# Patient Record
Sex: FEMALE | Race: OTHER | NOT HISPANIC OR LATINO | URBAN - METROPOLITAN AREA
[De-identification: names, ages, dates, MRNs, and addresses within clinical notes are randomized per-mention and may not be internally consistent; named-entity substitution may affect disease eponyms.]

---

## 2019-05-15 ENCOUNTER — INPATIENT (INPATIENT)
Facility: HOSPITAL | Age: 84
LOS: 14 days | Discharge: NURSING, REHAB OR LONG-TERM CARE | DRG: 603 | End: 2019-05-30
Attending: HOSPITALIST | Admitting: HOSPITALIST
Payer: COMMERCIAL

## 2019-05-15 VITALS
TEMPERATURE: 100 F | WEIGHT: 117.07 LBS | OXYGEN SATURATION: 100 % | HEART RATE: 86 BPM | RESPIRATION RATE: 18 BRPM | HEIGHT: 62 IN | DIASTOLIC BLOOD PRESSURE: 47 MMHG | SYSTOLIC BLOOD PRESSURE: 115 MMHG

## 2019-05-15 LAB
HCT VFR BLD CALC: 30.2 % — LOW (ref 34.5–45)
HGB BLD-MCNC: 9.7 G/DL — LOW (ref 11.5–15.5)
MCHC RBC-ENTMCNC: 32.1 GM/DL — SIGNIFICANT CHANGE UP (ref 32–36)
MCHC RBC-ENTMCNC: 34.3 PG — HIGH (ref 27–34)
MCV RBC AUTO: 106.7 FL — HIGH (ref 80–100)
PLATELET # BLD AUTO: 240 K/UL — SIGNIFICANT CHANGE UP (ref 150–400)
RBC # BLD: 2.83 M/UL — LOW (ref 3.8–5.2)
RBC # FLD: 11.7 % — SIGNIFICANT CHANGE UP (ref 10.3–14.5)
WBC # BLD: 8.41 K/UL — SIGNIFICANT CHANGE UP (ref 3.8–10.5)
WBC # FLD AUTO: 8.41 K/UL — SIGNIFICANT CHANGE UP (ref 3.8–10.5)

## 2019-05-15 NOTE — ED ADULT NURSE NOTE - NSIMPLEMENTINTERV_GEN_ALL_ED
Implemented All Universal Safety Interventions:  Talent to call system. Call bell, personal items and telephone within reach. Instruct patient to call for assistance. Room bathroom lighting operational. Non-slip footwear when patient is off stretcher. Physically safe environment: no spills, clutter or unnecessary equipment. Stretcher in lowest position, wheels locked, appropriate side rails in place.

## 2019-05-15 NOTE — ED ADULT NURSE NOTE - OBJECTIVE STATEMENT
The patient presents with left leg swelling, pain and left heel pressure injury--unstageable 2x2cm dark blister.

## 2019-05-16 DIAGNOSIS — G30.9 ALZHEIMER'S DISEASE, UNSPECIFIED: ICD-10-CM

## 2019-05-16 DIAGNOSIS — I10 ESSENTIAL (PRIMARY) HYPERTENSION: ICD-10-CM

## 2019-05-16 DIAGNOSIS — L03.116 CELLULITIS OF LEFT LOWER LIMB: ICD-10-CM

## 2019-05-16 DIAGNOSIS — Z29.9 ENCOUNTER FOR PROPHYLACTIC MEASURES, UNSPECIFIED: ICD-10-CM

## 2019-05-16 DIAGNOSIS — D64.9 ANEMIA, UNSPECIFIED: ICD-10-CM

## 2019-05-16 LAB
ALBUMIN SERPL ELPH-MCNC: 3.2 G/DL — LOW (ref 3.5–5)
ALP SERPL-CCNC: 43 U/L — SIGNIFICANT CHANGE UP (ref 40–120)
ALT FLD-CCNC: 17 U/L DA — SIGNIFICANT CHANGE UP (ref 10–60)
ANION GAP SERPL CALC-SCNC: 5 MMOL/L — SIGNIFICANT CHANGE UP (ref 5–17)
ANION GAP SERPL CALC-SCNC: 6 MMOL/L — SIGNIFICANT CHANGE UP (ref 5–17)
AST SERPL-CCNC: 12 U/L — SIGNIFICANT CHANGE UP (ref 10–40)
BASOPHILS # BLD AUTO: 0.04 K/UL — SIGNIFICANT CHANGE UP (ref 0–0.2)
BASOPHILS # BLD AUTO: 0.05 K/UL — SIGNIFICANT CHANGE UP (ref 0–0.2)
BASOPHILS NFR BLD AUTO: 0.6 % — SIGNIFICANT CHANGE UP (ref 0–2)
BASOPHILS NFR BLD AUTO: 0.6 % — SIGNIFICANT CHANGE UP (ref 0–2)
BILIRUB SERPL-MCNC: 0.2 MG/DL — SIGNIFICANT CHANGE UP (ref 0.2–1.2)
BUN SERPL-MCNC: 27 MG/DL — HIGH (ref 7–18)
BUN SERPL-MCNC: 31 MG/DL — HIGH (ref 7–18)
CALCIUM SERPL-MCNC: 8.5 MG/DL — SIGNIFICANT CHANGE UP (ref 8.4–10.5)
CALCIUM SERPL-MCNC: 8.8 MG/DL — SIGNIFICANT CHANGE UP (ref 8.4–10.5)
CHLORIDE SERPL-SCNC: 111 MMOL/L — HIGH (ref 96–108)
CHLORIDE SERPL-SCNC: 113 MMOL/L — HIGH (ref 96–108)
CO2 SERPL-SCNC: 23 MMOL/L — SIGNIFICANT CHANGE UP (ref 22–31)
CO2 SERPL-SCNC: 23 MMOL/L — SIGNIFICANT CHANGE UP (ref 22–31)
CREAT SERPL-MCNC: 1.16 MG/DL — SIGNIFICANT CHANGE UP (ref 0.5–1.3)
CREAT SERPL-MCNC: 1.24 MG/DL — SIGNIFICANT CHANGE UP (ref 0.5–1.3)
EOSINOPHIL # BLD AUTO: 0.08 K/UL — SIGNIFICANT CHANGE UP (ref 0–0.5)
EOSINOPHIL # BLD AUTO: 0.09 K/UL — SIGNIFICANT CHANGE UP (ref 0–0.5)
EOSINOPHIL NFR BLD AUTO: 1 % — SIGNIFICANT CHANGE UP (ref 0–6)
EOSINOPHIL NFR BLD AUTO: 1.3 % — SIGNIFICANT CHANGE UP (ref 0–6)
FERRITIN SERPL-MCNC: 103 NG/ML — SIGNIFICANT CHANGE UP (ref 15–150)
FOLATE SERPL-MCNC: 17.7 NG/ML — SIGNIFICANT CHANGE UP
GLUCOSE SERPL-MCNC: 123 MG/DL — HIGH (ref 70–99)
GLUCOSE SERPL-MCNC: 157 MG/DL — HIGH (ref 70–99)
HCT VFR BLD CALC: 28.5 % — LOW (ref 34.5–45)
HGB BLD-MCNC: 8.9 G/DL — LOW (ref 11.5–15.5)
IMM GRANULOCYTES NFR BLD AUTO: 1.4 % — SIGNIFICANT CHANGE UP (ref 0–1.5)
IMM GRANULOCYTES NFR BLD AUTO: 1.5 % — SIGNIFICANT CHANGE UP (ref 0–1.5)
IRON SATN MFR SERPL: 14 % — LOW (ref 15–50)
IRON SATN MFR SERPL: 35 UG/DL — LOW (ref 40–150)
LYMPHOCYTES # BLD AUTO: 1.53 K/UL — SIGNIFICANT CHANGE UP (ref 1–3.3)
LYMPHOCYTES # BLD AUTO: 1.61 K/UL — SIGNIFICANT CHANGE UP (ref 1–3.3)
LYMPHOCYTES # BLD AUTO: 18.2 % — SIGNIFICANT CHANGE UP (ref 13–44)
LYMPHOCYTES # BLD AUTO: 23.6 % — SIGNIFICANT CHANGE UP (ref 13–44)
MAGNESIUM SERPL-MCNC: 2.4 MG/DL — SIGNIFICANT CHANGE UP (ref 1.6–2.6)
MCHC RBC-ENTMCNC: 31.2 GM/DL — LOW (ref 32–36)
MCHC RBC-ENTMCNC: 34.4 PG — HIGH (ref 27–34)
MCV RBC AUTO: 110 FL — HIGH (ref 80–100)
MONOCYTES # BLD AUTO: 0.75 K/UL — SIGNIFICANT CHANGE UP (ref 0–0.9)
MONOCYTES # BLD AUTO: 1.09 K/UL — HIGH (ref 0–0.9)
MONOCYTES NFR BLD AUTO: 11 % — SIGNIFICANT CHANGE UP (ref 2–14)
MONOCYTES NFR BLD AUTO: 13 % — SIGNIFICANT CHANGE UP (ref 2–14)
NEUTROPHILS # BLD AUTO: 4.24 K/UL — SIGNIFICANT CHANGE UP (ref 1.8–7.4)
NEUTROPHILS # BLD AUTO: 5.54 K/UL — SIGNIFICANT CHANGE UP (ref 1.8–7.4)
NEUTROPHILS NFR BLD AUTO: 62 % — SIGNIFICANT CHANGE UP (ref 43–77)
NEUTROPHILS NFR BLD AUTO: 65.8 % — SIGNIFICANT CHANGE UP (ref 43–77)
NRBC # BLD: 0 /100 WBCS — SIGNIFICANT CHANGE UP (ref 0–0)
NRBC # BLD: 0 /100 WBCS — SIGNIFICANT CHANGE UP (ref 0–0)
PHOSPHATE SERPL-MCNC: 3.2 MG/DL — SIGNIFICANT CHANGE UP (ref 2.5–4.5)
PLATELET # BLD AUTO: 247 K/UL — SIGNIFICANT CHANGE UP (ref 150–400)
POTASSIUM SERPL-MCNC: 4.4 MMOL/L — SIGNIFICANT CHANGE UP (ref 3.5–5.3)
POTASSIUM SERPL-MCNC: 4.6 MMOL/L — SIGNIFICANT CHANGE UP (ref 3.5–5.3)
POTASSIUM SERPL-SCNC: 4.4 MMOL/L — SIGNIFICANT CHANGE UP (ref 3.5–5.3)
POTASSIUM SERPL-SCNC: 4.6 MMOL/L — SIGNIFICANT CHANGE UP (ref 3.5–5.3)
PROCALCITONIN SERPL-MCNC: 0.1 NG/ML — SIGNIFICANT CHANGE UP (ref 0.02–0.1)
PROT SERPL-MCNC: 6.7 G/DL — SIGNIFICANT CHANGE UP (ref 6–8.3)
RBC # BLD: 2.59 M/UL — LOW (ref 3.8–5.2)
RBC # FLD: 11.9 % — SIGNIFICANT CHANGE UP (ref 10.3–14.5)
SODIUM SERPL-SCNC: 140 MMOL/L — SIGNIFICANT CHANGE UP (ref 135–145)
SODIUM SERPL-SCNC: 141 MMOL/L — SIGNIFICANT CHANGE UP (ref 135–145)
TIBC SERPL-MCNC: 244 UG/DL — LOW (ref 250–450)
TRANSFERRIN SERPL-MCNC: 198 MG/DL — LOW (ref 200–360)
UIBC SERPL-MCNC: 209 UG/DL — SIGNIFICANT CHANGE UP (ref 110–370)
VIT B12 SERPL-MCNC: <150 PG/ML — LOW (ref 232–1245)
WBC # BLD: 6.83 K/UL — SIGNIFICANT CHANGE UP (ref 3.8–10.5)
WBC # FLD AUTO: 6.83 K/UL — SIGNIFICANT CHANGE UP (ref 3.8–10.5)

## 2019-05-16 PROCEDURE — 73610 X-RAY EXAM OF ANKLE: CPT | Mod: 26,LT

## 2019-05-16 PROCEDURE — 73630 X-RAY EXAM OF FOOT: CPT | Mod: 26,LT

## 2019-05-16 PROCEDURE — 99285 EMERGENCY DEPT VISIT HI MDM: CPT | Mod: 25

## 2019-05-16 PROCEDURE — 99223 1ST HOSP IP/OBS HIGH 75: CPT

## 2019-05-16 RX ORDER — CEFAZOLIN SODIUM 1 G
500 VIAL (EA) INJECTION EVERY 8 HOURS
Refills: 0 | Status: DISCONTINUED | OUTPATIENT
Start: 2019-05-16 | End: 2019-05-18

## 2019-05-16 RX ORDER — LORATADINE 10 MG/1
1 TABLET ORAL
Qty: 0 | Refills: 0 | DISCHARGE

## 2019-05-16 RX ORDER — DONEPEZIL HYDROCHLORIDE 10 MG/1
10 TABLET, FILM COATED ORAL AT BEDTIME
Refills: 0 | Status: DISCONTINUED | OUTPATIENT
Start: 2019-05-16 | End: 2019-05-16

## 2019-05-16 RX ORDER — DONEPEZIL HYDROCHLORIDE 10 MG/1
1 TABLET, FILM COATED ORAL
Qty: 0 | Refills: 0 | DISCHARGE

## 2019-05-16 RX ORDER — GABAPENTIN 400 MG/1
100 CAPSULE ORAL
Refills: 0 | Status: DISCONTINUED | OUTPATIENT
Start: 2019-05-16 | End: 2019-05-30

## 2019-05-16 RX ORDER — DONEPEZIL HYDROCHLORIDE 10 MG/1
10 TABLET, FILM COATED ORAL AT BEDTIME
Refills: 0 | Status: DISCONTINUED | OUTPATIENT
Start: 2019-05-16 | End: 2019-05-30

## 2019-05-16 RX ORDER — ENOXAPARIN SODIUM 100 MG/ML
30 INJECTION SUBCUTANEOUS DAILY
Refills: 0 | Status: DISCONTINUED | OUTPATIENT
Start: 2019-05-16 | End: 2019-05-30

## 2019-05-16 RX ADMIN — DONEPEZIL HYDROCHLORIDE 10 MILLIGRAM(S): 10 TABLET, FILM COATED ORAL at 23:22

## 2019-05-16 RX ADMIN — Medication 100 MILLIGRAM(S): at 15:56

## 2019-05-16 RX ADMIN — Medication 100 MILLIGRAM(S): at 01:00

## 2019-05-16 RX ADMIN — ENOXAPARIN SODIUM 30 MILLIGRAM(S): 100 INJECTION SUBCUTANEOUS at 12:04

## 2019-05-16 RX ADMIN — Medication 100 MILLIGRAM(S): at 16:59

## 2019-05-16 RX ADMIN — Medication 100 MILLIGRAM(S): at 07:43

## 2019-05-16 RX ADMIN — GABAPENTIN 100 MILLIGRAM(S): 400 CAPSULE ORAL at 17:00

## 2019-05-16 NOTE — H&P ADULT - HISTORY OF PRESENT ILLNESS
89 y/o F with PMHx of Alzheimer's disease, HTN, and R breast cancer s/p  mastectomy 25 years ago came in with c/o L leg swelling and pain x 1 month. Patient states that the she had a small wound on her left heel since 1 month which has been worsening. It is associated with L foot erythema, L leg swelling and pain. Denies any traumatic event prior to this episode. Denies fever. She said the wound is black in color and is increasing in size. She went to see her PMD 1 week ago and was prescribed doxycycline without significant improvement. No other complaints at this time.    SH: denies smoking or alcohol use. Lives by herself, has A 24/7. Wheelchair bound at baseline

## 2019-05-16 NOTE — H&P ADULT - PROBLEM SELECTOR PLAN 3
Hb 9.7 with MCV of 107  likely anemia of chronic disease  no hx of melena, hematemesis, or hematochezia  - f/u iron panel, folate, and B12

## 2019-05-16 NOTE — H&P ADULT - NSHPREVIEWOFSYSTEMS_GEN_ALL_CORE
REVIEW OF SYSTEMS:  CONSTITUTIONAL: No fever, weight loss, or fatigue  EYES: No eye pain, visual disturbances, or discharge  ENMT:  No difficulty hearing, tinnitus, vertigo; No sinus or throat pain  NECK: No pain or stiffness  RESPIRATORY: No cough, wheezing, chills or hemoptysis; No shortness of breath  CARDIOVASCULAR: left leg swelling and pain  GASTROINTESTINAL: No abdominal or epigastric pain. No nausea, vomiting, or hematemesis; No diarrhea or constipation. No melena or hematochezia.  GENITOURINARY: No dysuria, frequency, hematuria, or incontinence  NEUROLOGICAL: No headaches, memory loss, loss of strength, numbness, or tremors  SKIN: black fluid filled wound on left heel  ENDOCRINE: No heat or cold intolerance; No hair loss  MUSCULOSKELETAL: No joint pain or swelling; No muscle, back, or extremity pain  HEME/LYMPH: No easy bruising, or bleeding gums  ALLERY AND IMMUNOLOGIC: rash with penicillin

## 2019-05-16 NOTE — H&P ADULT - NSHPLABSRESULTS_GEN_ALL_CORE
9.7    8.41  )-----------( 240      ( 15 May 2019 23:38 )             30.2     05-15    140  |  111<H>  |  31<H>  ----------------------------<  123<H>  4.6   |  23  |  1.24    Ca    8.8      15 May 2019 23:38    TPro  6.7  /  Alb  3.2<L>  /  TBili  0.2  /  DBili  x   /  AST  12  /  ALT  17  /  AlkPhos  43  05-15

## 2019-05-16 NOTE — H&P ADULT - ASSESSMENT
89 y/o F with PMHx of Alzheimer's disease, HTN, and R breast cancer s/p  mastectomy 25 years ago came in with c/o L leg swelling and pain x 1 month. Patient states that the she had a small wound on her left heel since 1 month which has been worsening. It is associated with L foot erythema, L leg swelling and pain. Denies any traumatic event prior to this episode. Denies fever. She said the wound is black in color and is increasing in size. She went to see her PMD 1 week ago and was prescribed doxycycline without significant improvement.

## 2019-05-16 NOTE — ADVANCED PRACTICE NURSE CONSULT - RECOMMEDATIONS
-Clean all affected areas with warm water, mild soap, pat dry, and apply skin prep to the surrounding skin  -Protect the L. Heel wound with an ABD pad Daily PRN  -Elevate/float the patients heels using heel protectors and reposition the patient Q 2hrs using wedges or pillows

## 2019-05-16 NOTE — H&P ADULT - ATTENDING COMMENTS
She is a 89 year old woman with PMH of Alzheimer's disease, HTN, who presents to the ED on account of a left leg swelling and pain x 1 month. She describes a gradually enlarging dark swelling on the left heel.  There was no associated fevers, no chills, no constitutional symptoms. However had a low grade temp in the Ed - 100.1F  He received a week course of doxycycline with no changes.    Vital Signs Last 24 Hrs  T(C): 36.4 (16 May 2019 07:30), Max: 37.8 (15 May 2019 19:43)  T(F): 97.5 (16 May 2019 07:30), Max: 100.1 (15 May 2019 19:43)  HR: 76 (16 May 2019 07:30) (76 - 86)  BP: 113/77 (16 May 2019 07:30) (113/77 - 116/35)  RR: 18 (16 May 2019 07:30) (18 - 18)  SpO2: 98% (16 May 2019 07:30) (98% - 100%)    Elderly woman, NAD, AAO X 3  CTA B/L RRR S1S2  Soft NT ND BS +  pt refusing exam due to extreme pain - clean dressing over left foot and ankle region. Surrounding swelling with mild erythematous changes; no warmth. Exquisitely tender to touch.   Picture of heel noted                          9.7    8.41  )-----------( 240      ( 15 May 2019 23:38 )             30.2     05-15    140  |  111<H>  |  31<H>  ----------------------------<  123<H>  4.6   |  23  |  1.24    Ca    8.8      15 May 2019 23:38    TPro  6.7  /  Alb  3.2<L>  /  TBili  0.2  /  DBili  x   /  AST  12  /  ALT  17  /  AlkPhos  43  05-15    X ray of left foot/ankle noted- no bony or significant soft tissue changes noted.    Impression  89 year old woman with no significant vascular of DM hx presenting with a left heel dark bullae with surrounding soft tissue inflammation s/p "failed" outpatient therapy    A/P  - Left heel/foot /ankle soft tissue inflammation/infection  Admit to medicine  Empiric antibiotics with cefazolin ( low -cross-reactivity with penicillin)  Wound care consult  Pain control  F/up pending labs  If condition worsens on current therapy, would reconsider more specific imaging study, escalate antibiotics and ID consultation.

## 2019-05-16 NOTE — ED PROVIDER NOTE - SKIN COLOR
LE redness and swelling from left ankle up to left mid lower extremity. Area is warm to touch. Heel with tender callus, discolored.

## 2019-05-16 NOTE — H&P ADULT - PROBLEM SELECTOR PLAN 1
Black fluid filled blister of left heel with erythema on left ankle and lower leg  Left leg swelling and b/l LE tenderness  s/p doxycycline outpatient x 7 days  No purulence noted, no leukocytosis, no fever  - f/u b/l LE doppler  - Patient has penicillin allergy (rash)- will start on IV Cefazolin (No risk factors for MRSA)  - wound care consult  - pain control  - f/u blood cx  - f/u procalcitonin

## 2019-05-16 NOTE — H&P ADULT - PROBLEM SELECTOR PLAN 2
on losartan 100mg qday at home   BP on the lower end given her age  - Will hold off to home antihypertensive at this time, restart when appropriate

## 2019-05-16 NOTE — ED PROVIDER NOTE - OBJECTIVE STATEMENT
89 y/o F with past hx of Alzheimer's, HTN, right breast CA s/p mastectomy 25 years ago presents to the ED with family at bedside after pain and swelling to left leg x1 week. As per family, swelling redness, and pain has been worsening. Pt had 7 day tx of doxycycline. She denies fevers, chills, or any other symptoms. 89 y/o F with past hx of Alzheimer's, HTN, right breast CA s/p mastectomy 25 years ago presents to the ED with family at bedside after pain and swelling to left leg x1 week. As per family, swelling redness, and pain has been worsening. Pt had 7 day tx of doxycycline without improvement. She denies fevers, chills, or any other symptoms.

## 2019-05-16 NOTE — ED PROVIDER NOTE - CARDIAC, MLM
Normal rate, regular rhythm.  Heart sounds S1, S2.  No murmurs, rubs or gallops. distal pulses intact

## 2019-05-16 NOTE — H&P ADULT - NSHPPHYSICALEXAM_GEN_ALL_CORE
Vital Signs Last 24 Hrs  T(C): 37.8 (15 May 2019 19:43), Max: 37.8 (15 May 2019 19:43)  T(F): 100.1 (15 May 2019 19:43), Max: 100.1 (15 May 2019 19:43)  HR: 86 (15 May 2019 19:43) (86 - 86)  BP: 115/47 (15 May 2019 19:43) (115/47 - 115/47)  BP(mean): --  RR: 18 (15 May 2019 19:43) (18 - 18)  SpO2: 100% (15 May 2019 19:43) (100% - 100%)    GENERAL: NAD  HEAD:  Atraumatic, Normocephalic  EYES: EOMI, PERRLA, conjunctiva and sclera clear  ENMT: Moist mucous membranes  NECK: Supple  NERVOUS SYSTEM:  Alert & Oriented X 2-3  CHEST/LUNG: Clear to auscultation bilaterally  HEART: Regular rate and rhythm; No murmurs, rubs, or gallops  ABDOMEN: Soft, Nontender, Nondistended; Bowel sounds present  EXTREMITIES:  2+ Peripheral Pulses, Black blister at the heel, erythema from the left foot/ankle to lower leg, tender b/l LE, Calf tenderness +

## 2019-05-16 NOTE — ADVANCED PRACTICE NURSE CONSULT - ASSESSMENT
This is a 89yr old female patient admitted for Cellulitis, presenting with the following:  -There is an intact DTI to the L. Heel (3.5cm x 5cm) without drainage  -There is a Stage 1 Pressure Injury to the Bilateral Gluteus and Sacrum, as evident by non-blanchable erythema

## 2019-05-17 LAB
ALBUMIN SERPL ELPH-MCNC: 2.9 G/DL — LOW (ref 3.5–5)
ALP SERPL-CCNC: 38 U/L — LOW (ref 40–120)
ALT FLD-CCNC: 14 U/L DA — SIGNIFICANT CHANGE UP (ref 10–60)
ANION GAP SERPL CALC-SCNC: 7 MMOL/L — SIGNIFICANT CHANGE UP (ref 5–17)
AST SERPL-CCNC: 10 U/L — SIGNIFICANT CHANGE UP (ref 10–40)
BILIRUB SERPL-MCNC: 0.2 MG/DL — SIGNIFICANT CHANGE UP (ref 0.2–1.2)
BUN SERPL-MCNC: 26 MG/DL — HIGH (ref 7–18)
CALCIUM SERPL-MCNC: 8.7 MG/DL — SIGNIFICANT CHANGE UP (ref 8.4–10.5)
CHLORIDE SERPL-SCNC: 113 MMOL/L — HIGH (ref 96–108)
CO2 SERPL-SCNC: 21 MMOL/L — LOW (ref 22–31)
CREAT SERPL-MCNC: 0.99 MG/DL — SIGNIFICANT CHANGE UP (ref 0.5–1.3)
GLUCOSE BLDC GLUCOMTR-MCNC: 198 MG/DL — HIGH (ref 70–99)
GLUCOSE SERPL-MCNC: 102 MG/DL — HIGH (ref 70–99)
HCT VFR BLD CALC: 28.6 % — LOW (ref 34.5–45)
HGB BLD-MCNC: 9.1 G/DL — LOW (ref 11.5–15.5)
MCHC RBC-ENTMCNC: 31.8 GM/DL — LOW (ref 32–36)
MCHC RBC-ENTMCNC: 34 PG — SIGNIFICANT CHANGE UP (ref 27–34)
MCV RBC AUTO: 106.7 FL — HIGH (ref 80–100)
NRBC # BLD: 0 /100 WBCS — SIGNIFICANT CHANGE UP (ref 0–0)
PLATELET # BLD AUTO: 246 K/UL — SIGNIFICANT CHANGE UP (ref 150–400)
POTASSIUM SERPL-MCNC: 4.4 MMOL/L — SIGNIFICANT CHANGE UP (ref 3.5–5.3)
POTASSIUM SERPL-SCNC: 4.4 MMOL/L — SIGNIFICANT CHANGE UP (ref 3.5–5.3)
PROT SERPL-MCNC: 6 G/DL — SIGNIFICANT CHANGE UP (ref 6–8.3)
RBC # BLD: 2.68 M/UL — LOW (ref 3.8–5.2)
RBC # FLD: 11.7 % — SIGNIFICANT CHANGE UP (ref 10.3–14.5)
SODIUM SERPL-SCNC: 141 MMOL/L — SIGNIFICANT CHANGE UP (ref 135–145)
WBC # BLD: 5.85 K/UL — SIGNIFICANT CHANGE UP (ref 3.8–10.5)
WBC # FLD AUTO: 5.85 K/UL — SIGNIFICANT CHANGE UP (ref 3.8–10.5)

## 2019-05-17 PROCEDURE — 93970 EXTREMITY STUDY: CPT | Mod: 26

## 2019-05-17 PROCEDURE — 99233 SBSQ HOSP IP/OBS HIGH 50: CPT | Mod: GC

## 2019-05-17 RX ORDER — OXYCODONE HYDROCHLORIDE 5 MG/1
5 TABLET ORAL ONCE
Refills: 0 | Status: DISCONTINUED | OUTPATIENT
Start: 2019-05-17 | End: 2019-05-17

## 2019-05-17 RX ORDER — PREGABALIN 225 MG/1
1000 CAPSULE ORAL DAILY
Refills: 0 | Status: COMPLETED | OUTPATIENT
Start: 2019-05-17 | End: 2019-05-24

## 2019-05-17 RX ORDER — ACETAMINOPHEN 500 MG
1000 TABLET ORAL ONCE
Refills: 0 | Status: COMPLETED | OUTPATIENT
Start: 2019-05-17 | End: 2019-05-17

## 2019-05-17 RX ORDER — ACETAMINOPHEN 500 MG
650 TABLET ORAL EVERY 6 HOURS
Refills: 0 | Status: DISCONTINUED | OUTPATIENT
Start: 2019-05-17 | End: 2019-05-30

## 2019-05-17 RX ADMIN — Medication 100 MILLIGRAM(S): at 00:05

## 2019-05-17 RX ADMIN — Medication 1000 MILLIGRAM(S): at 11:30

## 2019-05-17 RX ADMIN — DONEPEZIL HYDROCHLORIDE 10 MILLIGRAM(S): 10 TABLET, FILM COATED ORAL at 22:06

## 2019-05-17 RX ADMIN — GABAPENTIN 100 MILLIGRAM(S): 400 CAPSULE ORAL at 05:54

## 2019-05-17 RX ADMIN — Medication 100 MILLIGRAM(S): at 05:53

## 2019-05-17 RX ADMIN — GABAPENTIN 100 MILLIGRAM(S): 400 CAPSULE ORAL at 17:31

## 2019-05-17 RX ADMIN — OXYCODONE HYDROCHLORIDE 5 MILLIGRAM(S): 5 TABLET ORAL at 18:00

## 2019-05-17 RX ADMIN — Medication 100 MILLIGRAM(S): at 14:02

## 2019-05-17 RX ADMIN — Medication 400 MILLIGRAM(S): at 11:06

## 2019-05-17 RX ADMIN — ENOXAPARIN SODIUM 30 MILLIGRAM(S): 100 INJECTION SUBCUTANEOUS at 14:02

## 2019-05-17 RX ADMIN — OXYCODONE HYDROCHLORIDE 5 MILLIGRAM(S): 5 TABLET ORAL at 17:31

## 2019-05-17 RX ADMIN — Medication 100 MILLIGRAM(S): at 22:06

## 2019-05-17 NOTE — PROGRESS NOTE ADULT - PROBLEM SELECTOR PLAN 1
WBC 5.8  c/w Ancef  wound care consult pending   pain control  f/u blood cx  f/u procalcitonin.  Venous Duplex: lower femoral vein, popliteal vein, and calf veins are not scanned due to patient's request to terminate the examination. otherwise no DVT

## 2019-05-17 NOTE — PROGRESS NOTE ADULT - PROBLEM SELECTOR PLAN 2
On losartan 100mg q day at home   Continue to hold and start when appropriate as BP trending on low end  VS per routine  DASH diet

## 2019-05-17 NOTE — PHYSICAL THERAPY INITIAL EVALUATION ADULT - ADDITIONAL COMMENTS
Patient [place]-speaking. Granddaughter at bedside provided English<>Sudanese translation, with patient's permission using  services (ID# [id number]). Last time patient ambulated was 6 years ago, but she was able to transfer from bed <> wheelchair using RW, with assist, as per patient's daughter. Patient Mauritian-speaking. Granddaughter at bedside provided English<>Pakistani translation, with patient's permission using  services (ID# [id number]). Last time patient ambulated was 6 years ago, but she was able to transfer from bed <> wheelchair using RW, with assist, as per patient's granddaughter.

## 2019-05-17 NOTE — PROGRESS NOTE ADULT - ATTENDING COMMENTS
Patient seen and examined at bedside, feels ok  physical exam:  Vital Signs Last 24 Hrs  T(C): 36.8 (17 May 2019 14:30), Max: 37.4 (16 May 2019 19:24)  T(F): 98.2 (17 May 2019 14:30), Max: 99.4 (16 May 2019 19:24)  HR: 76 (17 May 2019 14:30) (76 - 86)  BP: 101/40 (17 May 2019 14:30) (101/40 - 128/52)  BP(mean): --  RR: 16 (17 May 2019 14:30) (14 - 19)  SpO2: 99% (17 May 2019 14:30) (99% - 100%)  Left LE: redness still noted at the medial aspect of the angle  Black DTI noted on the heel  A/p    1- Left LE cellulitis: no wbc count, no fever  only redness noted  continue Cefzole    2- DTI: wound care nurse input appreciated  Keep heels off loaded from bed.   PT input appreciated

## 2019-05-17 NOTE — PHYSICAL THERAPY INITIAL EVALUATION ADULT - DIAGNOSIS, PT EVAL
Patient presents with slightly impaired balance during sitting, secondary to general weakness in the trunk and BUE 3+/5, RLE 3+/5.

## 2019-05-17 NOTE — PHYSICAL THERAPY INITIAL EVALUATION ADULT - PERTINENT HX OF CURRENT PROBLEM, REHAB EVAL
Patient came to ED from home s/p L swelling and pain x1 month. Patient came to ED from home s/p L LE swelling and pain x1 month.

## 2019-05-17 NOTE — PROGRESS NOTE ADULT - SUBJECTIVE AND OBJECTIVE BOX
This is a 89 y/o F with PMHx sig for Alzheimer's disease, HTN, and R breast cancer s/p  mastectomy 25 years ago came who presented to the ED with c/o L leg swelling and pain x 1 month. Endorses     MEDICATIONS  (STANDING):  ceFAZolin   IVPB 500 milliGRAM(s) IV Intermittent every 8 hours  donepezil 10 milliGRAM(s) Oral at bedtime  enoxaparin Injectable 30 milliGRAM(s) SubCutaneous daily  gabapentin 100 milliGRAM(s) Oral two times a day    MEDICATIONS  (PRN):      __________________________________________________  REVIEW OF SYSTEMS:    CONSTITUTIONAL: No fever,   EYES: no acute visual disturbances  NECK: No pain or stiffness  RESPIRATORY: No cough; No shortness of breath  CARDIOVASCULAR: No chest pain, no palpitations  GASTROINTESTINAL: No pain. No nausea or vomiting; No diarrhea   NEUROLOGICAL: No headache or numbness, no tremors  MUSCULOSKELETAL: No joint pain, no muscle pain  GENITOURINARY: no dysuria, no frequency, no hesitancy  PSYCHIATRY: no depression , no anxiety  ALL OTHER  ROS negative        Vital Signs Last 24 Hrs  T(C): 36.7 (17 May 2019 05:37), Max: 37.4 (16 May 2019 19:24)  T(F): 98 (17 May 2019 05:37), Max: 99.4 (16 May 2019 19:24)  HR: 80 (17 May 2019 05:37) (76 - 92)  BP: 128/52 (17 May 2019 05:37) (109/44 - 128/52)  BP(mean): --  RR: 14 (17 May 2019 05:37) (14 - 20)  SpO2: 100% (17 May 2019 05:37) (100% - 100%)    ________________________________________________  PHYSICAL EXAM:  GENERAL: NAD  HEENT: Normocephalic;  conjunctivae and sclerae clear; moist mucous membranes;   NECK : supple  CHEST/LUNG: Clear to auscultation bilaterally with good air entry   HEART: S1 S2  regular; no murmurs, gallops or rubs  ABDOMEN: Soft, Nontender, Nondistended; Bowel sounds present  EXTREMITIES: no cyanosis; no edema; no calf tenderness  SKIN: warm and dry; no rash  NERVOUS SYSTEM:  Awake and alert; Oriented  to place, person and time ; no new deficits    _________________________________________________  LABS:                        9.1    5.85  )-----------( 246      ( 17 May 2019 06:36 )             28.6     05-17    141  |  113<H>  |  26<H>  ----------------------------<  102<H>  4.4   |  21<L>  |  0.99    Ca    8.7      17 May 2019 06:36  Phos  3.2     05-16  Mg     2.4     05-16    TPro  6.0  /  Alb  2.9<L>  /  TBili  0.2  /  DBili  x   /  AST  10  /  ALT  14  /  AlkPhos  38<L>  05-17        CAPILLARY BLOOD GLUCOSE      POCT Blood Glucose.: 198 mg/dL (17 May 2019 11:51)        RADIOLOGY & ADDITIONAL TESTS:    Imaging  Reviewed:  YES/NO    Consultant(s) Notes Reviewed:   YES/ No      Plan of care was discussed with patient and /or primary care giver; all questions and concerns were addressed This is a 89 y/o F with PMHx sig for Alzheimer's disease, HTN, and R breast cancer s/p  mastectomy 25 years ago came who presented to the ED with c/o L leg swelling and pain x 1 month. Endorses being seen and evaluated by her PCP and given Doxycycline X 7 days without improvement.     MEDICATIONS  (STANDING):  ceFAZolin   IVPB 500 milliGRAM(s) IV Intermittent every 8 hours  donepezil 10 milliGRAM(s) Oral at bedtime  enoxaparin Injectable 30 milliGRAM(s) SubCutaneous daily  gabapentin 100 milliGRAM(s) Oral two times a day    MEDICATIONS  (PRN):      __________________________________________________  REVIEW OF SYSTEMS:  CONSTITUTIONAL: No fever,   EYES: no acute visual disturbances  NECK: No pain or stiffness  RESPIRATORY: No cough; No shortness of breath  CARDIOVASCULAR: No chest pain, no palpitations  GASTROINTESTINAL: No pain. No nausea or vomiting; No diarrhea   NEUROLOGICAL: No headache or numbness, no tremors  MUSCULOSKELETAL: left ankle pain   GENITOURINARY: no dysuria, no frequency, no hesitancy  PSYCHIATRY: no depression , no anxiety  ALL OTHER  ROS negative        Vital Signs Last 24 Hrs  T(C): 36.7 (17 May 2019 05:37), Max: 37.4 (16 May 2019 19:24)  T(F): 98 (17 May 2019 05:37), Max: 99.4 (16 May 2019 19:24)  HR: 80 (17 May 2019 05:37) (76 - 92)  BP: 128/52 (17 May 2019 05:37) (109/44 - 128/52)  BP(mean): --  RR: 14 (17 May 2019 05:37) (14 - 20)  SpO2: 100% (17 May 2019 05:37) (100% - 100%)    ________________________________________________  PHYSICAL EXAM:  GENERAL: NAD  HEENT: Normocephalic;  conjunctivae and sclerae clear; moist mucous membranes;   NECK : supple  CHEST/LUNG: Clear to auscultation bilaterally   HEART: S1 S2  regular; no murmurs, gallops or rubs  ABDOMEN: Soft, Nontender, Nondistended; Bowel sounds present  EXTREMITIES: no cyanosis; no edema; no calf tenderness,, blister at the heel, erythema from the left foot/ankle to lower leg  SKIN: warm and dry; no rash  NERVOUS SYSTEM:  Awake and alert; Oriented  to place, person and time ; no new deficits    _________________________________________________  LABS:                        9.1    5.85  )-----------( 246      ( 17 May 2019 06:36 )             28.6     05-17    141  |  113<H>  |  26<H>  ----------------------------<  102<H>  4.4   |  21<L>  |  0.99    Ca    8.7      17 May 2019 06:36  Phos  3.2     05-16  Mg     2.4     05-16    TPro  6.0  /  Alb  2.9<L>  /  TBili  0.2  /  DBili  x   /  AST  10  /  ALT  14  /  AlkPhos  38<L>  05-17        CAPILLARY BLOOD GLUCOSE      POCT Blood Glucose.: 198 mg/dL (17 May 2019 11:51)        RADIOLOGY & ADDITIONAL TESTS:    Imaging  Reviewed:  YES    Consultant(s) Notes Reviewed:   YES/      Plan of care was discussed with patient and /or primary care giver; all questions and concerns were addressed

## 2019-05-17 NOTE — PHYSICAL THERAPY INITIAL EVALUATION ADULT - ACTIVE RANGE OF MOTION EXAMINATION, REHAB EVAL
deficits as listed below/Right LE Active ROM was WFL (within functional limits)/RLE WFL, LLE no AROM at knee or hip

## 2019-05-17 NOTE — PHYSICAL THERAPY INITIAL EVALUATION ADULT - IMPAIRMENTS CONTRIBUTING IMPAIRED BED MOBILITY, REHAB EVAL
impaired balance/pain/decreased ROM/BUE 3+/5, RLE 3+/5, LLE not assessed due to pain in LLE/decreased strength

## 2019-05-18 DIAGNOSIS — T14.8XXA OTHER INJURY OF UNSPECIFIED BODY REGION, INITIAL ENCOUNTER: ICD-10-CM

## 2019-05-18 PROCEDURE — 99232 SBSQ HOSP IP/OBS MODERATE 35: CPT | Mod: GC

## 2019-05-18 RX ADMIN — Medication 650 MILLIGRAM(S): at 21:57

## 2019-05-18 RX ADMIN — Medication 100 MILLIGRAM(S): at 13:48

## 2019-05-18 RX ADMIN — Medication 100 MILLIGRAM(S): at 05:38

## 2019-05-18 RX ADMIN — Medication 650 MILLIGRAM(S): at 21:12

## 2019-05-18 RX ADMIN — PREGABALIN 1000 MICROGRAM(S): 225 CAPSULE ORAL at 12:29

## 2019-05-18 RX ADMIN — Medication 110 MILLIGRAM(S): at 17:33

## 2019-05-18 RX ADMIN — ENOXAPARIN SODIUM 30 MILLIGRAM(S): 100 INJECTION SUBCUTANEOUS at 12:29

## 2019-05-18 RX ADMIN — Medication 650 MILLIGRAM(S): at 13:00

## 2019-05-18 RX ADMIN — GABAPENTIN 100 MILLIGRAM(S): 400 CAPSULE ORAL at 05:38

## 2019-05-18 RX ADMIN — DONEPEZIL HYDROCHLORIDE 10 MILLIGRAM(S): 10 TABLET, FILM COATED ORAL at 21:12

## 2019-05-18 RX ADMIN — GABAPENTIN 100 MILLIGRAM(S): 400 CAPSULE ORAL at 17:33

## 2019-05-18 RX ADMIN — Medication 650 MILLIGRAM(S): at 12:29

## 2019-05-18 NOTE — PROGRESS NOTE ADULT - SUBJECTIVE AND OBJECTIVE BOX
Patient is a 89y old  Female who presents with a chief complaint of L leg swelling and pain (17 May 2019 14:09)      INTERVAL HPI/OVERNIGHT EVENTS:  Patient seen and examined at bedside, feels ok  Her left leg is more redder then before, PT recommended Home with [facility] PT.     Allergies    penicillin (Rash)    Intolerances        REVIEW OF SYSTEMS:  CONSTITUTIONAL: No fever, weight loss, or fatigue  EYES: No eye pain, visual disturbances, or discharge  RESPIRATORY: No cough, wheezing or shortness of breath  CARDIOVASCULAR: No chest pain, feeling of heart beats  GASTROINTESTINAL: No abdominal or epigastric pain. No nausea, vomiting; No diarrhea or constipation.  GENITOURINARY: No dysuria, frequency, hematuria  NEUROLOGICAL: No headaches  MUSCULOSKELETAL: No joint pain  PSYCHIATRIC: No depression or anxiety  HEME/LYMPH: No easy bruising, or bleeding gums  ALLERGY AND IMMUNOLOGIC: No hives or eczema    Medications:  acetaminophen   Tablet .. 650 milliGRAM(s) Oral every 6 hours PRN Temp greater or equal to 38C (100.4F), Moderate Pain (4 - 6)  ceFAZolin   IVPB 500 milliGRAM(s) IV Intermittent every 8 hours  cyanocobalamin Injectable 1000 MICROGram(s) IntraMuscular daily  donepezil 10 milliGRAM(s) Oral at bedtime  enoxaparin Injectable 30 milliGRAM(s) SubCutaneous daily  gabapentin 100 milliGRAM(s) Oral two times a day      PHYSICAL EXAM:  Vital Signs Last 24 Hrs  T(C): 36.6 (18 May 2019 13:11), Max: 36.6 (18 May 2019 05:36)  T(F): 97.9 (18 May 2019 13:11), Max: 97.9 (18 May 2019 13:11)  HR: 87 (18 May 2019 13:11) (71 - 87)  BP: 110/41 (18 May 2019 13:11) (110/41 - 124/38)  BP(mean): --  RR: 16 (18 May 2019 13:11) (14 - 16)  SpO2: 100% (18 May 2019 13:11) (100% - 100%)    GENERAL: NAD  HEAD:  Atraumatic, Normocephalic  EYES: EOMI, PERRLA, conjunctiva and sclera clear  ENT: moist mucous membranes  NECK: Supple, No JVD, Normal thyroid  NERVOUS SYSTEM:  Alert & Oriented X3, Good concentration; Motor Strength 5/5 B/L upper and lower extremities; DTRs 2+ intact and symmetric  CHEST/LUNG: No rales, rhonchi, wheezing, or rubs  HEART: Regular rate and rhythm; No murmurs, rubs, or gallops  ABDOMEN: Soft, Nontender, Nondistended; Bowel sounds present  EXTREMITIES: left lower extremity at the ankle: more redness and pain compared to before.   SKIN: as above.     LABS:                        9.1    5.85  )-----------( 246      ( 17 May 2019 06:36 )             28.6     05-17    141  |  113<H>  |  26<H>  ----------------------------<  102<H>  4.4   |  21<L>  |  0.99    Ca    8.7      17 May 2019 06:36    TPro  6.0  /  Alb  2.9<L>  /  TBili  0.2  /  DBili  x   /  AST  10  /  ALT  14  /  AlkPhos  38<L>  05-17    LIVER FUNCTIONS - ( 17 May 2019 06:36 )  Alb: 2.9 g/dL / Pro: 6.0 g/dL / ALK PHOS: 38 U/L / ALT: 14 U/L DA / AST: 10 U/L / GGT: x                     Culture & Sensitivity:   CAPILLARY BLOOD GLUCOSE            RADIOLOGY & ADDITIONAL TESTS:  Radiology testing independently reviewed:     Consultant(s) Notes Reviewed:  [ x] YES  [ ] NO    Care Discussed with Consultants/Other Providers [ x] YES  [ ] NO

## 2019-05-19 ENCOUNTER — TRANSCRIPTION ENCOUNTER (OUTPATIENT)
Age: 84
End: 2019-05-19

## 2019-05-19 PROCEDURE — 99232 SBSQ HOSP IP/OBS MODERATE 35: CPT

## 2019-05-19 RX ORDER — PREGABALIN 225 MG/1
1000 CAPSULE ORAL DAILY
Refills: 0 | Status: DISCONTINUED | OUTPATIENT
Start: 2019-05-19 | End: 2019-05-19

## 2019-05-19 RX ADMIN — Medication 110 MILLIGRAM(S): at 17:34

## 2019-05-19 RX ADMIN — DONEPEZIL HYDROCHLORIDE 10 MILLIGRAM(S): 10 TABLET, FILM COATED ORAL at 21:31

## 2019-05-19 RX ADMIN — GABAPENTIN 100 MILLIGRAM(S): 400 CAPSULE ORAL at 17:35

## 2019-05-19 RX ADMIN — ENOXAPARIN SODIUM 30 MILLIGRAM(S): 100 INJECTION SUBCUTANEOUS at 11:25

## 2019-05-19 RX ADMIN — PREGABALIN 1000 MICROGRAM(S): 225 CAPSULE ORAL at 11:25

## 2019-05-19 RX ADMIN — Medication 110 MILLIGRAM(S): at 05:34

## 2019-05-19 RX ADMIN — GABAPENTIN 100 MILLIGRAM(S): 400 CAPSULE ORAL at 05:34

## 2019-05-19 NOTE — DISCHARGE NOTE PROVIDER - NSDCFUADDINST_GEN_ALL_CORE_FT
Wound Care: Blood blister: betadine to left heel, cover with foam dsg and offload b/l heels.  Re evaluate at [facility]

## 2019-05-19 NOTE — PROGRESS NOTE ADULT - PROBLEM SELECTOR PLAN 2
wound care nurse input appreciated  frequent turning in bed Q2 hours  keep heels off loading.    consult for home needs.

## 2019-05-19 NOTE — DISCHARGE NOTE PROVIDER - CARE PROVIDER_API CALL
Adrianna Gooden)  Geriatric Medicine; Internal Medicine  [address] [place] [address]  Phone: [phone]  Fax: [phone]  Follow Up Time: Adrianna Gooden)  Geriatric Medicine; Internal Medicine  [address] [place] [address]  Phone: [phone]  Fax: [phone]  Follow Up Time:     MD at Facility,   Phone: (   )    -  Fax: (   )    -  Follow Up Time:

## 2019-05-19 NOTE — PROGRESS NOTE ADULT - SUBJECTIVE AND OBJECTIVE BOX
Patient is a 89y old  Female who presents with a chief complaint of L leg swelling and pain (19 May 2019 13:12)      INTERVAL HPI/OVERNIGHT EVENTS:  Patient seen and examined at bedside, feels ok   Her left medical aspect of her foot and ankle still is reddish and painful, will give more time for antibiotics to act.   Allergies    penicillin (Rash)    Intolerances        REVIEW OF SYSTEMS:  CONSTITUTIONAL: No fever, weight loss, or fatigue  EYES: No eye pain, visual disturbances, or discharge  RESPIRATORY: No cough, wheezing or shortness of breath  CARDIOVASCULAR: No chest pain, feeling of heart beats  GASTROINTESTINAL: No abdominal or epigastric pain. No nausea, vomiting; No diarrhea or constipation.  GENITOURINARY: No dysuria, frequency, hematuria  NEUROLOGICAL: No headaches  MUSCULOSKELETAL: Pain in the left ankle  PSYCHIATRIC: No depression or anxiety  HEME/LYMPH: No easy bruising, or bleeding gums  ALLERGY AND IMMUNOLOGIC: No hives or eczema    Medications:  acetaminophen   Tablet .. 650 milliGRAM(s) Oral every 6 hours PRN Temp greater or equal to 38C (100.4F), Moderate Pain (4 - 6)  cyanocobalamin Injectable 1000 MICROGram(s) IntraMuscular daily  donepezil 10 milliGRAM(s) Oral at bedtime  doxycycline IVPB 100 milliGRAM(s) IV Intermittent every 12 hours  doxycycline IVPB      enoxaparin Injectable 30 milliGRAM(s) SubCutaneous daily  gabapentin 100 milliGRAM(s) Oral two times a day      PHYSICAL EXAM:  Vital Signs Last 24 Hrs  T(C): 37.6 (19 May 2019 12:59), Max: 37.6 (19 May 2019 12:59)  T(F): 99.7 (19 May 2019 12:59), Max: 99.7 (19 May 2019 12:59)  HR: 88 (19 May 2019 12:59) (86 - 91)  BP: 113/38 (19 May 2019 12:59) (104/39 - 113/38)  BP(mean): --  RR: 16 (19 May 2019 12:59) (16 - 16)  SpO2: 99% (19 May 2019 12:59) (99% - 100%)    GENERAL: NAD  HEAD:  Atraumatic, Normocephalic  EYES: EOMI, PERRLA, conjunctiva and sclera clear  ENT: moist mucous membranes  NECK: Supple, No JVD, Normal thyroid  NERVOUS SYSTEM:  Alert & awake  CHEST/LUNG: No rales, rhonchi, wheezing, or rubs  HEART: Regular rate and rhythm; No murmurs, rubs, or gallops  ABDOMEN: Soft, Nontender, Nondistended; Bowel sounds present  EXTREMITIES: redness over the medial aspect of her ankle and foot.   SKIN: as above    LABS:                      Culture & Sensitivity:   CAPILLARY BLOOD GLUCOSE          05-18 @ 07:01  -  05-19 @ 07:00  --------------------------------------------------------  IN: 150 mL / OUT: 0 mL / NET: 150 mL        RADIOLOGY & ADDITIONAL TESTS:  Radiology testing independently reviewed:     Consultant(s) Notes Reviewed:  [ x] YES  [ ] NO    Care Discussed with Consultants/Other Providers [ x] YES  [ ] NO

## 2019-05-19 NOTE — DISCHARGE NOTE PROVIDER - NSDCCPCAREPLAN_GEN_ALL_CORE_FT
PRINCIPAL DISCHARGE DIAGNOSIS  Diagnosis: Cellulitis of left lower extremity  Assessment and Plan of Treatment: You were admitted with cellulitis of your left leg. You received IV antibiotics in the hospital, please complete your course of oral antibiotics. Please follow up with your primary care doctor in one week. Please seek medical attention if you develop fevers, chills, or your infection worsens. PRINCIPAL DISCHARGE DIAGNOSIS  Diagnosis: Cellulitis of left lower extremity  Assessment and Plan of Treatment: You were admitted with cellulitis of your left leg. You received IV antibiotics in the hospital, please complete your course of oral antibiotics. Please follow up with your primary care doctor in one week. Please seek medical attention if you develop fevers, chills, or your infection worsens.      SECONDARY DISCHARGE DIAGNOSES  Diagnosis: Blister of left heel, initial encounter  Assessment and Plan of Treatment: You had this intact blister prior to admission. It is still intact. It should get betadine to blister and foam protective dsg and offload heels b/l

## 2019-05-19 NOTE — DISCHARGE NOTE PROVIDER - NSDCACTIVITY_GEN_ALL_CORE
No heavy lifting/straining/Do not drive or operate machinery/Walking - Indoors allowed/Do not make important decisions

## 2019-05-19 NOTE — DISCHARGE NOTE PROVIDER - PROVIDER TOKENS
PROVIDER:[TOKEN:[[id number]]] PROVIDER:[TOKEN:[[id number]]],FREE:[LAST:[MD at Facility],PHONE:[(   )    -],FAX:[(   )    -]]

## 2019-05-19 NOTE — DISCHARGE NOTE PROVIDER - HOSPITAL COURSE
89F with PMH of Alzheimer's disease, HTN, who presents to the ED on account of a left leg swelling and pain x 1 month. She describes a gradually enlarging dark swelling on the left heel.  There was no associated fevers, no chills, no constitutional symptoms. However had a low grade temp in the Ed - 100.1 She received a week course of doxycycline with no changes.        X ray of left foot/ankle noted- no bony or significant soft tissue changes noted. Pt admitted for cellulitis and started on IV antibiotics. 89F with PMH of Alzheimer's disease, HTN, who presents to the ED on account of a left leg swelling and pain x 1 month. She describes a gradually enlarging dark swelling on the left heel.  There was no associated fevers, no chills, no constitutional symptoms. However had a low grade temp in the Ed - 100.1 She received a week course of doxycycline with no changes.        X ray of left foot/ankle noted- no bony or significant soft tissue changes noted. Pt admitted for cellulitis and started on IV antibiotics.    Pt was first on Ancef which was changed to Doxycycline. That was also discontinued and Vancomycin was started. We are awaiting authorization since  at this time for [facility] since 5/24 89F with PMH of Alzheimer's disease, HTN, who presents to the ED on account of a left leg swelling and pain x 1 month. She describes a gradually enlarging dark swelling on the left heel.  There was no associated fevers, no chills, no constitutional symptoms. However had a low grade temp in the Ed - 100.1 She received a week course of doxycycline with no changes.        X ray of left foot/ankle noted- no bony or significant soft tissue changes noted. Pt admitted for cellulitis and started on IV antibiotics.    Pt was first on Ancef which was changed to Doxycycline. That was also discontinued and Vancomycin was started. We were awaiting authorization  for [facility] since 5/24. Pt has authorization for T[facility] but family has changed their minds and requesting [facility]. Now need authorization for .

## 2019-05-20 LAB
ANION GAP SERPL CALC-SCNC: 9 MMOL/L — SIGNIFICANT CHANGE UP (ref 5–17)
BUN SERPL-MCNC: 35 MG/DL — HIGH (ref 7–18)
CALCIUM SERPL-MCNC: 8.7 MG/DL — SIGNIFICANT CHANGE UP (ref 8.4–10.5)
CHLORIDE SERPL-SCNC: 111 MMOL/L — HIGH (ref 96–108)
CO2 SERPL-SCNC: 20 MMOL/L — LOW (ref 22–31)
CREAT SERPL-MCNC: 1.1 MG/DL — SIGNIFICANT CHANGE UP (ref 0.5–1.3)
GLUCOSE SERPL-MCNC: 102 MG/DL — HIGH (ref 70–99)
HCT VFR BLD CALC: 29.6 % — LOW (ref 34.5–45)
HGB BLD-MCNC: 9.3 G/DL — LOW (ref 11.5–15.5)
MCHC RBC-ENTMCNC: 31.4 GM/DL — LOW (ref 32–36)
MCHC RBC-ENTMCNC: 33.5 PG — SIGNIFICANT CHANGE UP (ref 27–34)
MCV RBC AUTO: 106.5 FL — HIGH (ref 80–100)
NRBC # BLD: 0 /100 WBCS — SIGNIFICANT CHANGE UP (ref 0–0)
PLATELET # BLD AUTO: 260 K/UL — SIGNIFICANT CHANGE UP (ref 150–400)
POTASSIUM SERPL-MCNC: 4.3 MMOL/L — SIGNIFICANT CHANGE UP (ref 3.5–5.3)
POTASSIUM SERPL-SCNC: 4.3 MMOL/L — SIGNIFICANT CHANGE UP (ref 3.5–5.3)
RBC # BLD: 2.78 M/UL — LOW (ref 3.8–5.2)
RBC # FLD: 11.7 % — SIGNIFICANT CHANGE UP (ref 10.3–14.5)
SODIUM SERPL-SCNC: 140 MMOL/L — SIGNIFICANT CHANGE UP (ref 135–145)
WBC # BLD: 6.47 K/UL — SIGNIFICANT CHANGE UP (ref 3.8–10.5)
WBC # FLD AUTO: 6.47 K/UL — SIGNIFICANT CHANGE UP (ref 3.8–10.5)

## 2019-05-20 PROCEDURE — 99233 SBSQ HOSP IP/OBS HIGH 50: CPT | Mod: GC

## 2019-05-20 RX ADMIN — Medication 110 MILLIGRAM(S): at 05:39

## 2019-05-20 RX ADMIN — DONEPEZIL HYDROCHLORIDE 10 MILLIGRAM(S): 10 TABLET, FILM COATED ORAL at 21:36

## 2019-05-20 RX ADMIN — PREGABALIN 1000 MICROGRAM(S): 225 CAPSULE ORAL at 11:26

## 2019-05-20 RX ADMIN — GABAPENTIN 100 MILLIGRAM(S): 400 CAPSULE ORAL at 05:39

## 2019-05-20 RX ADMIN — ENOXAPARIN SODIUM 30 MILLIGRAM(S): 100 INJECTION SUBCUTANEOUS at 11:26

## 2019-05-20 RX ADMIN — GABAPENTIN 100 MILLIGRAM(S): 400 CAPSULE ORAL at 17:08

## 2019-05-20 RX ADMIN — Medication 110 MILLIGRAM(S): at 17:08

## 2019-05-20 NOTE — PROGRESS NOTE ADULT - PROBLEM SELECTOR PLAN 2
Leg elevation at all times  Home with [facility]  wound care nurse input appreciated

## 2019-05-20 NOTE — PROGRESS NOTE ADULT - SUBJECTIVE AND OBJECTIVE BOX
Patient is a 89y old  Female who presents with a chief complaint of L leg swelling and pain (19 May 2019 15:12)    INTERVAL HPI/OVERNIGHT EVENTS:  Patient seen and examined at bedside, family at her side.   Family changing their mind today and plan for dc to long term placement.   Allergies    penicillin (Rash)    Intolerances        REVIEW OF SYSTEMS:  cannot provide accurate answers for dementia.     Medications:  acetaminophen   Tablet .. 650 milliGRAM(s) Oral every 6 hours PRN Temp greater or equal to 38C (100.4F), Moderate Pain (4 - 6)  cyanocobalamin Injectable 1000 MICROGram(s) IntraMuscular daily  donepezil 10 milliGRAM(s) Oral at bedtime  doxycycline IVPB 100 milliGRAM(s) IV Intermittent every 12 hours  doxycycline IVPB      enoxaparin Injectable 30 milliGRAM(s) SubCutaneous daily  gabapentin 100 milliGRAM(s) Oral two times a day      PHYSICAL EXAM:  Vital Signs Last 24 Hrs  T(C): 36.3 (20 May 2019 11:44), Max: 37.3 (19 May 2019 21:18)  T(F): 97.3 (20 May 2019 11:44), Max: 99.2 (19 May 2019 21:18)  HR: 84 (20 May 2019 11:44) (80 - 84)  BP: 122/39 (20 May 2019 11:44) (114/33 - 129/45)  BP(mean): --  RR: 16 (20 May 2019 11:44) (16 - 16)  SpO2: 99% (20 May 2019 11:44) (99% - 100%)    GENERAL: NAD  HEAD:  Atraumatic, Normocephalic  EYES: EOMI, PERRLA, conjunctiva and sclera clear  ENT: moist mucous membranes  NECK: Supple, No JVD, Normal thyroid  NERVOUS SYSTEM:  Alert & awake  CHEST/LUNG: No rales, rhonchi, wheezing, or rubs  HEART: Regular rate and rhythm; No murmurs, rubs, or gallops  ABDOMEN: Soft, Nontender, Nondistended; Bowel sounds present  EXTREMITIES: left LE redness, heels off pressure.   SKIN: No rashes or lesions    LABS:                        9.3    6.47  )-----------( 260      ( 20 May 2019 07:51 )             29.6     05-20    140  |  111<H>  |  35<H>  ----------------------------<  102<H>  4.3   |  20<L>  |  1.10    Ca    8.7      20 May 2019 07:51                  Culture & Sensitivity:   CAPILLARY BLOOD GLUCOSE          05-19 @ 07:01  -  05-20 @ 07:00  --------------------------------------------------------  IN: 100 mL / OUT: 0 mL / NET: 100 mL        RADIOLOGY & ADDITIONAL TESTS:  Radiology testing independently reviewed:     Consultant(s) Notes Reviewed:  [ x] YES  [ ] NO    Care Discussed with Consultants/Other Providers [ x] YES  [ ] NO

## 2019-05-21 DIAGNOSIS — D51.9 VITAMIN B12 DEFICIENCY ANEMIA, UNSPECIFIED: ICD-10-CM

## 2019-05-21 LAB
CULTURE RESULTS: SIGNIFICANT CHANGE UP
CULTURE RESULTS: SIGNIFICANT CHANGE UP
SPECIMEN SOURCE: SIGNIFICANT CHANGE UP
SPECIMEN SOURCE: SIGNIFICANT CHANGE UP

## 2019-05-21 PROCEDURE — 93971 EXTREMITY STUDY: CPT | Mod: 26,LT

## 2019-05-21 PROCEDURE — 99233 SBSQ HOSP IP/OBS HIGH 50: CPT | Mod: GC

## 2019-05-21 RX ORDER — SODIUM CHLORIDE 9 MG/ML
250 INJECTION INTRAMUSCULAR; INTRAVENOUS; SUBCUTANEOUS ONCE
Refills: 0 | Status: COMPLETED | OUTPATIENT
Start: 2019-05-21 | End: 2019-05-21

## 2019-05-21 RX ADMIN — Medication 650 MILLIGRAM(S): at 15:29

## 2019-05-21 RX ADMIN — DONEPEZIL HYDROCHLORIDE 10 MILLIGRAM(S): 10 TABLET, FILM COATED ORAL at 21:00

## 2019-05-21 RX ADMIN — SODIUM CHLORIDE 250 MILLILITER(S): 9 INJECTION INTRAMUSCULAR; INTRAVENOUS; SUBCUTANEOUS at 18:51

## 2019-05-21 RX ADMIN — GABAPENTIN 100 MILLIGRAM(S): 400 CAPSULE ORAL at 18:50

## 2019-05-21 RX ADMIN — GABAPENTIN 100 MILLIGRAM(S): 400 CAPSULE ORAL at 06:10

## 2019-05-21 RX ADMIN — ENOXAPARIN SODIUM 30 MILLIGRAM(S): 100 INJECTION SUBCUTANEOUS at 12:07

## 2019-05-21 RX ADMIN — Medication 650 MILLIGRAM(S): at 16:36

## 2019-05-21 RX ADMIN — PREGABALIN 1000 MICROGRAM(S): 225 CAPSULE ORAL at 12:07

## 2019-05-21 RX ADMIN — Medication 110 MILLIGRAM(S): at 06:10

## 2019-05-21 NOTE — PROGRESS NOTE ADULT - ATTENDING COMMENTS
Patient seen and examined at bedside, Feels ok, sleepy.  Pending Long term placement as per family request.  PHysical exam;  Vital Signs Last 24 Hrs  T(C): 36.4 (21 May 2019 06:05), Max: 36.4 (21 May 2019 06:05)  T(F): 97.6 (21 May 2019 06:05), Max: 97.6 (21 May 2019 06:05)  HR: 76 (21 May 2019 06:05) (76 - 91)  BP: 103/39 (21 May 2019 06:05) (103/35 - 103/39)  BP(mean): --  RR: 16 (21 May 2019 06:05) (16 - 17)  SpO2: 99% (21 May 2019 06:05) (99% - 100%)  A/p  1- Left foot pain and cellulitis: switch to unasyn  dc doxy  Pending long term placement. Patient seen and examined at bedside, Feels ok, sleepy.  Pending Long term placement as per family request.  PHysical exam;  Vital Signs Last 24 Hrs  T(C): 36.4 (21 May 2019 06:05), Max: 36.4 (21 May 2019 06:05)  T(F): 97.6 (21 May 2019 06:05), Max: 97.6 (21 May 2019 06:05)  HR: 76 (21 May 2019 06:05) (76 - 91)  BP: 103/39 (21 May 2019 06:05) (103/35 - 103/39)  BP(mean): --  RR: 16 (21 May 2019 06:05) (16 - 17)  SpO2: 99% (21 May 2019 06:05) (99% - 100%)  A/p  1- Left foot pain and cellulitis  dc doxy  Pending long term placement.

## 2019-05-21 NOTE — PROGRESS NOTE ADULT - PROBLEM SELECTOR PLAN 2
On losartan 100mg q day at home   Continue to hold and start when appropriate as  this AM  VS per routine  DASH diet

## 2019-05-21 NOTE — PROGRESS NOTE ADULT - SUBJECTIVE AND OBJECTIVE BOX
89 y/o F with PMH of Alzheimer's disease, HTN, and R breast cancer s/p  mastectomy 25 years ago came in with c/o L leg swelling and pain x 1 month. Patient states that the she had a small wound on her left heel since 1 month which has been worsening. It is associated with L foot erythema, L leg swelling and pain. Denies any traumatic event prior to this episode. Denies fever. She said the wound is black in color and is increasing in size. She went to see her PMD 1 week ago and was prescribed doxycycline without significant improvement. No other complaints at this time.  Pt had attempted LLE doppler but was unable to be completed. Reordered for today  NP Note discussed with  Primary Attending    Patient is a 89y old  Female who presents with a chief complaint of L leg swelling and pain (20 May 2019 13:37)      INTERVAL HPI/OVERNIGHT EVENTS: no new complaints    MEDICATIONS  (STANDING):  cyanocobalamin Injectable 1000 MICROGram(s) IntraMuscular daily  donepezil 10 milliGRAM(s) Oral at bedtime  doxycycline IVPB 100 milliGRAM(s) IV Intermittent every 12 hours  doxycycline IVPB      enoxaparin Injectable 30 milliGRAM(s) SubCutaneous daily  gabapentin 100 milliGRAM(s) Oral two times a day    MEDICATIONS  (PRN):  acetaminophen   Tablet .. 650 milliGRAM(s) Oral every 6 hours PRN Temp greater or equal to 38C (100.4F), Moderate Pain (4 - 6)      __________________________________________________  REVIEW OF SYSTEMS:    CONSTITUTIONAL: No fever,   EYES: no acute visual disturbances  NECK: No pain or stiffness  RESPIRATORY: No cough; No shortness of breath  CARDIOVASCULAR: No chest pain, no palpitations  GASTROINTESTINAL: No pain. No nausea or vomiting; No diarrhea   NEUROLOGICAL: No headache or numbness, no tremors  MUSCULOSKELETAL: LLE pain  GENITOURINARY: no dysuria, no frequency, no hesitancy  PSYCHIATRY: no depression , no anxiety  ALL OTHER  ROS negative        Vital Signs Last 24 Hrs  T(C): 36.4 (21 May 2019 06:05), Max: 36.4 (21 May 2019 06:05)  T(F): 97.6 (21 May 2019 06:05), Max: 97.6 (21 May 2019 06:05)  HR: 76 (21 May 2019 06:05) (76 - 91)  BP: 103/39 (21 May 2019 06:05) (103/35 - 103/39)  BP(mean): --  RR: 16 (21 May 2019 06:05) (16 - 17)  SpO2: 99% (21 May 2019 06:05) (99% - 100%)    ________________________________________________  PHYSICAL EXAM:  GENERAL: NAD  HEENT: Normocephalic;  conjunctivae and sclerae clear; moist mucous membranes;   NECK : supple  CHEST/LUNG: Clear to auscultation bilaterally with good air entry   HEART: S1 S2  regular;  ABDOMEN: Soft, Nontender, Nondistended; Bowel sounds present  EXTREMITIES: Left forefoot with erythema and tender to touch  erythema to SKIN: warm and dry; no rash  NERVOUS SYSTEM:  Awake and alert; Oriented  to, person, no new deficits    _________________________________________________  LABS:                        9.3    6.47  )-----------( 260      ( 20 May 2019 07:51 )             29.6     05-20    140  |  111<H>  |  35<H>  ----------------------------<  102<H>  4.3   |  20<L>  |  1.10    Ca    8.7      20 May 2019 07:51          CAPILLARY BLOOD GLUCOSE            RADIOLOGY & ADDITIONAL TESTS:    Imaging Personally Reviewed:  YES  Consultant(s) Notes Reviewed:   YES  Care Discussed with Consultants :     Plan of care was discussed with Famly; all questions and concerns were addressed and care was aligned with patient's wishes.

## 2019-05-21 NOTE — PROGRESS NOTE ADULT - PROBLEM SELECTOR PLAN 1
WBC 5.8  Continue Ancef  wound care consult pending   pain control  f/u blood cx  f/u procalcitonin.  Venous Duplex: lower femoral vein, popliteal vein, and calf veins are not scanned due to patient's request to terminate the examination. otherwise no DVT WBC 5.8  Continue Ancef  wound care consult pending   pain control  Doppler reordered. Grand daughter to accompany her.  f/u blood cx  f/u procalcitonin.  Venous Duplex: lower femoral vein, popliteal vein, and calf veins are not scanned due to patient's request to terminate the examination. otherwise no DVT WBC 5.8  Continue Ancef  wound care consult pending   Doppler was completed today. - negative for DVT  pain control  Doppler reordered. Grand daughter to accompany her.  f/u blood cx  f/u procalcitonin.  Venous Duplex: lower femoral vein, popliteal vein, and calf veins are not scanned due to patient's request to terminate the examination. otherwise no DVT WBC 5.8  Continue Doxy IV  wound care consult pending   Doppler was completed today. - negative for DVT  pain control  Doppler reordered. Grand daughter to accompany her.  f/u blood cx  f/u procalcitonin.  Venous Duplex: lower femoral vein, popliteal vein, and calf veins are not scanned due to patient's request to terminate the examination. otherwise no DVT

## 2019-05-22 LAB
ANION GAP SERPL CALC-SCNC: 8 MMOL/L — SIGNIFICANT CHANGE UP (ref 5–17)
BUN SERPL-MCNC: 35 MG/DL — HIGH (ref 7–18)
CALCIUM SERPL-MCNC: 8.2 MG/DL — LOW (ref 8.4–10.5)
CHLORIDE SERPL-SCNC: 117 MMOL/L — HIGH (ref 96–108)
CO2 SERPL-SCNC: 20 MMOL/L — LOW (ref 22–31)
CREAT SERPL-MCNC: 1.03 MG/DL — SIGNIFICANT CHANGE UP (ref 0.5–1.3)
GLUCOSE SERPL-MCNC: 105 MG/DL — HIGH (ref 70–99)
HCT VFR BLD CALC: 27.8 % — LOW (ref 34.5–45)
HGB BLD-MCNC: 8.7 G/DL — LOW (ref 11.5–15.5)
MCHC RBC-ENTMCNC: 31.3 GM/DL — LOW (ref 32–36)
MCHC RBC-ENTMCNC: 33.5 PG — SIGNIFICANT CHANGE UP (ref 27–34)
MCV RBC AUTO: 106.9 FL — HIGH (ref 80–100)
NRBC # BLD: 0 /100 WBCS — SIGNIFICANT CHANGE UP (ref 0–0)
PLATELET # BLD AUTO: 237 K/UL — SIGNIFICANT CHANGE UP (ref 150–400)
POTASSIUM SERPL-MCNC: 4.4 MMOL/L — SIGNIFICANT CHANGE UP (ref 3.5–5.3)
POTASSIUM SERPL-SCNC: 4.4 MMOL/L — SIGNIFICANT CHANGE UP (ref 3.5–5.3)
RBC # BLD: 2.6 M/UL — LOW (ref 3.8–5.2)
RBC # FLD: 11.7 % — SIGNIFICANT CHANGE UP (ref 10.3–14.5)
SODIUM SERPL-SCNC: 145 MMOL/L — SIGNIFICANT CHANGE UP (ref 135–145)
WBC # BLD: 7.54 K/UL — SIGNIFICANT CHANGE UP (ref 3.8–10.5)
WBC # FLD AUTO: 7.54 K/UL — SIGNIFICANT CHANGE UP (ref 3.8–10.5)

## 2019-05-22 PROCEDURE — 99232 SBSQ HOSP IP/OBS MODERATE 35: CPT | Mod: GC

## 2019-05-22 RX ADMIN — Medication 650 MILLIGRAM(S): at 00:15

## 2019-05-22 RX ADMIN — GABAPENTIN 100 MILLIGRAM(S): 400 CAPSULE ORAL at 17:27

## 2019-05-22 RX ADMIN — PREGABALIN 1000 MICROGRAM(S): 225 CAPSULE ORAL at 12:14

## 2019-05-22 RX ADMIN — ENOXAPARIN SODIUM 30 MILLIGRAM(S): 100 INJECTION SUBCUTANEOUS at 12:15

## 2019-05-22 RX ADMIN — Medication 650 MILLIGRAM(S): at 01:00

## 2019-05-22 RX ADMIN — DONEPEZIL HYDROCHLORIDE 10 MILLIGRAM(S): 10 TABLET, FILM COATED ORAL at 21:59

## 2019-05-22 RX ADMIN — GABAPENTIN 100 MILLIGRAM(S): 400 CAPSULE ORAL at 05:34

## 2019-05-22 NOTE — DIETITIAN INITIAL EVALUATION ADULT. - OTHER INFO
Pt visited. Pt seen for LOS= 7 days. Pt is Malay speaking. D/W son at bedside.   Reports good appetite. Po tolerated. No chewing or swallowing difficulty  Reported.

## 2019-05-22 NOTE — PROGRESS NOTE ADULT - SUBJECTIVE AND OBJECTIVE BOX
89 y/o F with PMH of Alzheimer's disease, HTN, and R breast cancer s/p  mastectomy 25 years ago came in with c/o L leg swelling and pain x 1 month. Patient states that the she had a small wound on her left heel since 1 month which has been worsening. It is associated with L foot erythema, L leg swelling and pain. Denies any traumatic event prior to this episode. Denies fever. She said the wound is black in color and is increasing in size. She went to see her PMD 1 week ago and was prescribed doxycycline without significant improvement. No other complaints at this time.  Pt had attempted LLE doppler but was unable to be completed. Reordered. Pt was medicated and it was completed. Forefoot still with erythema but less tender.   NP Note discussed with  Primary Attending    Patient is a 89y old  Female who presents with a chief complaint of L leg swelling and pain (21 May 2019 12:05)      INTERVAL HPI/OVERNIGHT EVENTS: no new complaints    MEDICATIONS  (STANDING):  cyanocobalamin Injectable 1000 MICROGram(s) IntraMuscular daily  donepezil 10 milliGRAM(s) Oral at bedtime  enoxaparin Injectable 30 milliGRAM(s) SubCutaneous daily  gabapentin 100 milliGRAM(s) Oral two times a day    MEDICATIONS  (PRN):  acetaminophen   Tablet .. 650 milliGRAM(s) Oral every 6 hours PRN Temp greater or equal to 38C (100.4F), Moderate Pain (4 - 6)      __________________________________________________  REVIEW OF SYSTEMS:    CONSTITUTIONAL: No fever,   EYES: no acute visual disturbances  NECK: No pain or stiffness  RESPIRATORY: No cough; No shortness of breath  CARDIOVASCULAR: No chest pain, no palpitations  GASTROINTESTINAL: No pain. No nausea or vomiting; No diarrhea   NEUROLOGICAL: No headache or numbness, no tremors  MUSCULOSKELETAL: LLE pain to touch  GENITOURINARY: no dysuria, no frequency, no hesitancy  PSYCHIATRY: no depression , no anxiety  ALL OTHER  ROS negative        Vital Signs Last 24 Hrs  T(C): 36.4 (22 May 2019 14:46), Max: 37.9 (22 May 2019 00:42)  T(F): 97.6 (22 May 2019 14:46), Max: 100.3 (22 May 2019 00:42)  HR: 80 (22 May 2019 15:55) (74 - 84)  BP: 115/41 (22 May 2019 15:55) (115/41 - 129/42)  BP(mean): --  RR: 16 (22 May 2019 14:46) (16 - 16)  SpO2: 100% (22 May 2019 15:55) (99% - 100%)    ________________________________________________  PHYSICAL EXAM:  GENERAL: NAD  HEENT: Normocephalic;  conjunctivae and sclerae clear; moist mucous membranes;   NECK : supple  CHEST/LUNG: Clear to auscultation   HEART: S1 S2  regular;   ABDOMEN: Soft, Nontender, Nondistended; Bowel sounds present  EXTREMITIES: no cyanosis; no edema; no calf tenderness  SKIN: warm and dry; no rash  NERVOUS SYSTEM:  Awake and alert; Oriented  to place, person   _________________________________________________  LABS:                        8.7    7.54  )-----------( 237      ( 22 May 2019 06:44 )             27.8     05-22    145  |  117<H>  |  35<H>  ----------------------------<  105<H>  4.4   |  20<L>  |  1.03    Ca    8.2<L>      22 May 2019 06:44          CAPILLARY BLOOD GLUCOSE            RADIOLOGY & ADDITIONAL TESTS:    Imaging Personally Reviewed:  YES  Consultant(s) Notes Reviewed:   YES  Care Discussed with Consultants : Case mgmt    Plan of care was discussed with  primary care giver; all questions and concerns were addressed and care was aligned with patient's wishes. 89 y/o F with PMH of Alzheimer's disease, HTN, and R breast cancer s/p  mastectomy 25 years ago came in with c/o L leg swelling and pain x 1 month. Patient states that the she had a small wound on her left heel since 1 month which has been worsening. It is associated with L foot erythema, L leg swelling and pain. Denies any traumatic event prior to this episode. Denies fever. She said the wound is black in color and is increasing in size. She went to see her PMD 1 week ago and was prescribed doxycycline without significant improvement. No other complaints at this time.  Pt had attempted LLE doppler but was unable to be completed. Reordered. Pt was medicated and it was completed. Forefoot still with erythema but less tender.   NP Note discussed with  Primary Attending    Patient is a 89y old  Female who presents with a chief complaint of L leg swelling and pain (21 May 2019 12:05)      INTERVAL HPI/OVERNIGHT EVENTS: no new complaints    MEDICATIONS  (STANDING):  cyanocobalamin Injectable 1000 MICROGram(s) IntraMuscular daily  donepezil 10 milliGRAM(s) Oral at bedtime  enoxaparin Injectable 30 milliGRAM(s) SubCutaneous daily  gabapentin 100 milliGRAM(s) Oral two times a day    MEDICATIONS  (PRN):  acetaminophen   Tablet .. 650 milliGRAM(s) Oral every 6 hours PRN Temp greater or equal to 38C (100.4F), Moderate Pain (4 - 6)      __________________________________________________  REVIEW OF SYSTEMS:    CONSTITUTIONAL: No fever,   EYES: no acute visual disturbances  NECK: No pain or stiffness  RESPIRATORY: No cough; No shortness of breath  CARDIOVASCULAR: No chest pain, no palpitations  GASTROINTESTINAL: No pain. No nausea or vomiting; No diarrhea   NEUROLOGICAL: No headache or numbness, no tremors  MUSCULOSKELETAL: LLE pain to touch  GENITOURINARY: no dysuria, no frequency, no hesitancy  PSYCHIATRY: no depression , no anxiety  ALL OTHER  ROS negative        Vital Signs Last 24 Hrs  T(C): 36.4 (22 May 2019 14:46), Max: 37.9 (22 May 2019 00:42)  T(F): 97.6 (22 May 2019 14:46), Max: 100.3 (22 May 2019 00:42)  HR: 80 (22 May 2019 15:55) (74 - 84)  BP: 115/41 (22 May 2019 15:55) (115/41 - 129/42)  BP(mean): --  RR: 16 (22 May 2019 14:46) (16 - 16)  SpO2: 100% (22 May 2019 15:55) (99% - 100%)    ________________________________________________  PHYSICAL EXAM:  GENERAL: NAD  HEENT: Normocephalic;  conjunctivae and sclerae clear; moist mucous membranes;   NECK : supple  CHEST/LUNG: Clear to auscultation   HEART: S1 S2  regular;   ABDOMEN: Soft, Nontender, Nondistended; Bowel sounds present  EXTREMITIES: erythema  and edema persists  SKIN: warm and dry; no rash left heel black blister intact  NERVOUS SYSTEM:  Awake and alert; Oriented  to place, person   _________________________________________________  LABS:                        8.7    7.54  )-----------( 237      ( 22 May 2019 06:44 )             27.8     05-22    145  |  117<H>  |  35<H>  ----------------------------<  105<H>  4.4   |  20<L>  |  1.03    Ca    8.2<L>      22 May 2019 06:44          CAPILLARY BLOOD GLUCOSE            RADIOLOGY & ADDITIONAL TESTS:    Imaging Personally Reviewed:  YES  Consultant(s) Notes Reviewed:   YES  Care Discussed with Consultants : Case mgmt    Plan of care was discussed with  primary care giver; all questions and concerns were addressed and care was aligned with patient's wishes.

## 2019-05-22 NOTE — PROGRESS NOTE ADULT - SUBJECTIVE AND OBJECTIVE BOX
MEDICAL ATTENDING NOTE    Patient is a 89y old  Female who presents with a chief complaint of L leg swelling and pain (22 May 2019 16:06)      INTERVAL HPI/OVERNIGHT EVENTS: no new complaints    MEDICATIONS  (STANDING):  cyanocobalamin Injectable 1000 MICROGram(s) IntraMuscular daily  donepezil 10 milliGRAM(s) Oral at bedtime  enoxaparin Injectable 30 milliGRAM(s) SubCutaneous daily  gabapentin 100 milliGRAM(s) Oral two times a day    MEDICATIONS  (PRN):  acetaminophen   Tablet .. 650 milliGRAM(s) Oral every 6 hours PRN Temp greater or equal to 38C (100.4F), Moderate Pain (4 - 6)      __________________________________________________  ----------------------------------------------------------------------------------  REVIEW OF SYSTEMS: no fever, no SOB, No Chest pain; feels well      Vital Signs Last 24 Hrs  T(C): 36.4 (22 May 2019 14:46), Max: 37.9 (22 May 2019 00:42)  T(F): 97.6 (22 May 2019 14:46), Max: 100.3 (22 May 2019 00:42)  HR: 80 (22 May 2019 15:55) (74 - 80)  BP: 115/41 (22 May 2019 15:55) (115/41 - 129/42)  BP(mean): --  RR: 16 (22 May 2019 14:46) (16 - 16)  SpO2: 100% (22 May 2019 15:55) (99% - 100%)    _________________  PHYSICAL EXAM:  ---------------------------   NAD; Normocephalic;   LUNGS - no wheezing  HEART: S1 S2+   ABDOMEN: Soft, Nontender, non distended  EXTREMITIES: no cyanosis; no edema  NERVOUS SYSTEM:  Awake and alert; no new deficits    _________________________________________________  LABS:                        8.7    7.54  )-----------( 237      ( 22 May 2019 06:44 )             27.8     05-22    145  |  117<H>  |  35<H>  ----------------------------<  105<H>  4.4   |  20<L>  |  1.03    Ca    8.2<L>      22 May 2019 06:44          CAPILLARY BLOOD GLUCOSE                Care Discussed with Consultants :     Plan of care was discussed with patient ; all questions and concerns were addressed and care was aligned with patient's wishes.

## 2019-05-22 NOTE — PROGRESS NOTE ADULT - PROBLEM SELECTOR PLAN 2
On losartan 100mg q day at home   Continue to hold and start when appropriate as -118/30s this AM  VS per routine  DASH diet

## 2019-05-22 NOTE — DIETITIAN INITIAL EVALUATION ADULT. - PROBLEM/PLAN-2
Regional Procedure  Technique:  Epidural  Labor and Delivery Epidural:  Yes    Patient Preparation  Location: Labor and Delivery  Preanesthetic Checklist:  Patient Identified, Monitors and Equipment Checked, Timeout Performed, Risks and Benefits Discussed, IV Bolus - Crystalloid and Patient &/Or Fetus Hemodynamically Stable  Patient Position:  Sitting  Sedation:  None    Monitors Used:  NIBP, Pulse Oximetry, Tocodynamometer and FHT's  Oxygen Supplement:  Room Air  Prep:  Chlorhexidine with alcohol  Max Sterile Barrier Technique:  Cap/Mask, Hand Washing, Sterile gloves, Sterile drape and Sterile dressing applied  Securement:  Tape and Transparent dressing    Regional Basics  Local Infiltration:  1% Lidocaine  Local Infiltration Volume:  3mL  Approach:  Midline    Block Details  Technique:  Continuous  Interspace:  L2-3  Ultrasound Used:  No  X-Ray Used:  No  Loss of resistance at:  6.5cm  with:  Saline  Epidural needle type:  Tuohy  Epidural needle gauge:  17  Epidural needle length:  3.5 inCSF was not obtained.  Catheter:  Single Orifice  Catheter gauge:  19  Catheter Placement:  Easy  Catheter Distance at Skin:  13cm  Tunnel:  No  Catheter Fluid Return:  None  Test Dose:  Lidocaine 1.5% w/Epi  Test Dose Volume:  3 mL  Test Dose Result:  NegativeInitial Local Loading Anesthetic:  Bupivacaine  Initial Local Loading Concentration:  0.125%  Initial Local Loading Volume:  10mL  Initial Local Narcotic Used:  Fentanyl 20 mcg    Intrathecal Narcotics    Labor Regional Notes  Inadvertent Dural Puncture:  No  Fetal Heart Tones Pre Procedure:  stable bpm  Fetal Heart Tones Post Procedure:  stable bpmParesthesia: noBlood: no    Regional Anesthesia Note  KISHORE @ 6.5 cm x1 pass; no problems encountered.  Pt tolerated procedure without dificulty.         DISPLAY PLAN FREE TEXT

## 2019-05-22 NOTE — PROGRESS NOTE ADULT - ATTENDING COMMENTS
Plan of care discussed with patient's son and grand-daughter. Plan of care discussed with patient's son and grand-daughter.  DISPO- await placement

## 2019-05-22 NOTE — PROGRESS NOTE ADULT - PROBLEM SELECTOR PLAN 1
WBC 7.5  Continue Ancef. DVT negative  off load heels  Doppler was completed today. - negative for DVT  pain control  Doppler reordered. Grand daughter to accompany her.  f/u blood cx  f/u procalcitonin.  Venous Duplex: lower femoral vein, popliteal vein, and calf veins are not scanned due to patient's request to terminate the examination. otherwise no DVT WBC 7.5  off abx. DVT negative  off load heels  Doppler was completed today. - negative for DVT  pain control  Doppler reordered. Grand daughter to accompany her.  f/u blood cx  f/u procalcitonin.  Venous Duplex: lower femoral vein, popliteal vein, and calf veins are not scanned due to patient's request to terminate the examination. otherwise no DVT

## 2019-05-23 LAB
ANION GAP SERPL CALC-SCNC: 8 MMOL/L — SIGNIFICANT CHANGE UP (ref 5–17)
BUN SERPL-MCNC: 35 MG/DL — HIGH (ref 7–18)
CALCIUM SERPL-MCNC: 8.6 MG/DL — SIGNIFICANT CHANGE UP (ref 8.4–10.5)
CHLORIDE SERPL-SCNC: 114 MMOL/L — HIGH (ref 96–108)
CO2 SERPL-SCNC: 21 MMOL/L — LOW (ref 22–31)
CREAT SERPL-MCNC: 0.9 MG/DL — SIGNIFICANT CHANGE UP (ref 0.5–1.3)
GLUCOSE SERPL-MCNC: 113 MG/DL — HIGH (ref 70–99)
HCT VFR BLD CALC: 28.6 % — LOW (ref 34.5–45)
HGB BLD-MCNC: 9.1 G/DL — LOW (ref 11.5–15.5)
MCHC RBC-ENTMCNC: 31.8 GM/DL — LOW (ref 32–36)
MCHC RBC-ENTMCNC: 34 PG — SIGNIFICANT CHANGE UP (ref 27–34)
MCV RBC AUTO: 106.7 FL — HIGH (ref 80–100)
NRBC # BLD: 0 /100 WBCS — SIGNIFICANT CHANGE UP (ref 0–0)
PLATELET # BLD AUTO: 253 K/UL — SIGNIFICANT CHANGE UP (ref 150–400)
POTASSIUM SERPL-MCNC: 4.6 MMOL/L — SIGNIFICANT CHANGE UP (ref 3.5–5.3)
POTASSIUM SERPL-SCNC: 4.6 MMOL/L — SIGNIFICANT CHANGE UP (ref 3.5–5.3)
RBC # BLD: 2.68 M/UL — LOW (ref 3.8–5.2)
RBC # FLD: 11.9 % — SIGNIFICANT CHANGE UP (ref 10.3–14.5)
SODIUM SERPL-SCNC: 143 MMOL/L — SIGNIFICANT CHANGE UP (ref 135–145)
WBC # BLD: 7.97 K/UL — SIGNIFICANT CHANGE UP (ref 3.8–10.5)
WBC # FLD AUTO: 7.97 K/UL — SIGNIFICANT CHANGE UP (ref 3.8–10.5)

## 2019-05-23 PROCEDURE — 99232 SBSQ HOSP IP/OBS MODERATE 35: CPT | Mod: GC

## 2019-05-23 RX ORDER — PANTOPRAZOLE SODIUM 20 MG/1
40 TABLET, DELAYED RELEASE ORAL DAILY
Refills: 0 | Status: DISCONTINUED | OUTPATIENT
Start: 2019-05-23 | End: 2019-05-30

## 2019-05-23 RX ORDER — GABAPENTIN 400 MG/1
0 CAPSULE ORAL
Qty: 0 | Refills: 0 | DISCHARGE

## 2019-05-23 RX ORDER — POVIDONE-IODINE 5 %
1 AEROSOL (ML) TOPICAL DAILY
Refills: 0 | Status: DISCONTINUED | OUTPATIENT
Start: 2019-05-23 | End: 2019-05-30

## 2019-05-23 RX ORDER — PANTOPRAZOLE SODIUM 20 MG/1
1 TABLET, DELAYED RELEASE ORAL
Qty: 0 | Refills: 0 | DISCHARGE
Start: 2019-05-23

## 2019-05-23 RX ORDER — ACETAMINOPHEN 500 MG
2 TABLET ORAL
Qty: 0 | Refills: 0 | DISCHARGE
Start: 2019-05-23

## 2019-05-23 RX ORDER — GABAPENTIN 400 MG/1
1 CAPSULE ORAL
Qty: 0 | Refills: 0 | DISCHARGE
Start: 2019-05-23

## 2019-05-23 RX ADMIN — Medication 650 MILLIGRAM(S): at 21:00

## 2019-05-23 RX ADMIN — Medication 650 MILLIGRAM(S): at 19:37

## 2019-05-23 RX ADMIN — DONEPEZIL HYDROCHLORIDE 10 MILLIGRAM(S): 10 TABLET, FILM COATED ORAL at 22:29

## 2019-05-23 RX ADMIN — GABAPENTIN 100 MILLIGRAM(S): 400 CAPSULE ORAL at 17:47

## 2019-05-23 RX ADMIN — GABAPENTIN 100 MILLIGRAM(S): 400 CAPSULE ORAL at 05:56

## 2019-05-23 RX ADMIN — PANTOPRAZOLE SODIUM 40 MILLIGRAM(S): 20 TABLET, DELAYED RELEASE ORAL at 13:22

## 2019-05-23 RX ADMIN — PREGABALIN 1000 MICROGRAM(S): 225 CAPSULE ORAL at 12:46

## 2019-05-23 RX ADMIN — ENOXAPARIN SODIUM 30 MILLIGRAM(S): 100 INJECTION SUBCUTANEOUS at 12:46

## 2019-05-23 NOTE — PROGRESS NOTE ADULT - SUBJECTIVE AND OBJECTIVE BOX
89 y/o F with PMHx of Alzheimer's disease, HTN, and R breast cancer s/p  mastectomy 25 years ago came in with c/o L leg swelling and pain x 1 month. Patient states that the she had a small wound on her left heel since 1 month which has been worsening. It is associated with L foot erythema, L leg swelling and pain. Denies any traumatic event prior to this episode. Denies fever. She said the wound is black in color and is increasing in size. She went to see her PMD 1 week ago and was prescribed doxycycline without significant improvement.   Left Heel remains Black but intact. Erythema to LLE remains with erythema and warmth. Pt remains on iv abx  NP Note discussed with  Primary Attending    Patient is a 89y old  Female who presents with a chief complaint of L leg swelling and pain (22 May 2019 17:10)      INTERVAL HPI/OVERNIGHT EVENTS: no new complaints    MEDICATIONS  (STANDING):  cyanocobalamin Injectable 1000 MICROGram(s) IntraMuscular daily  donepezil 10 milliGRAM(s) Oral at bedtime  enoxaparin Injectable 30 milliGRAM(s) SubCutaneous daily  gabapentin 100 milliGRAM(s) Oral two times a day    MEDICATIONS  (PRN):  acetaminophen   Tablet .. 650 milliGRAM(s) Oral every 6 hours PRN Temp greater or equal to 38C (100.4F), Moderate Pain (4 - 6)      __________________________________________________  REVIEW OF SYSTEMS:    CONSTITUTIONAL: No fever,   EYES: no acute visual disturbances  NECK: No pain or stiffness  RESPIRATORY: No cough; No shortness of breath  CARDIOVASCULAR: No chest pain, no palpitations  GASTROINTESTINAL: No pain. No nausea or vomiting; No diarrhea   NEUROLOGICAL: No headache or numbness, no tremors  MUSCULOSKELETAL: No joint pain, no muscle pain  GENITOURINARY: no dysuria, no frequency, no hesitancy  PSYCHIATRY: no depression , no anxiety  ALL OTHER  ROS negative        Vital Signs Last 24 Hrs  T(C): 37 (23 May 2019 06:06), Max: 37 (23 May 2019 06:06)  T(F): 98.6 (23 May 2019 06:06), Max: 98.6 (23 May 2019 06:06)  HR: 75 (23 May 2019 06:06) (67 - 80)  BP: 119/63 (23 May 2019 06:06) (113/41 - 119/63)  BP(mean): --  RR: 14 (23 May 2019 06:06) (14 - 16)  SpO2: 97% (23 May 2019 06:06) (97% - 100%)    ________________________________________________  PHYSICAL EXAM:  GENERAL: NAD  HEENT: Normocephalic;  conjunctivae and sclerae clear; moist mucous membranes;   NECK : supple  CHEST/LUNG: Clear to auscultation bilaterally with good air entry   HEART: S1 S2  regular; no murmurs, gallops or rubs  ABDOMEN: Soft, Nontender, Nondistended; Bowel sounds present  EXTREMITIES: no cyanosis; no edema; no calf tenderness  SKIN: warm and dry; no rash  NERVOUS SYSTEM:  Awake and alert; Oriented  to place, person and time ; no new deficits    _________________________________________________  LABS:                        9.1    7.97  )-----------( 253      ( 23 May 2019 05:45 )             28.6     05-23    143  |  114<H>  |  35<H>  ----------------------------<  113<H>  4.6   |  21<L>  |  0.90    Ca    8.6      23 May 2019 05:45          CAPILLARY BLOOD GLUCOSE            RADIOLOGY & ADDITIONAL TESTS:    Imaging Personally Reviewed:  YES    Consultant(s) Notes Reviewed:   YES  Care Discussed with Consultants :     Plan of care was discussed with family all questions and concerns were addressed and care was aligned with patient's wishes.

## 2019-05-23 NOTE — PROGRESS NOTE ADULT - PROBLEM SELECTOR PLAN 1
WBC 7.8  Continue Ancef  wound care consult pending   Doppler was completed today. - negative for DVT  pain control  Doppler reordered. Grand daughter to accompany her.  f/u blood cx  f/u procalcitonin.  Venous Duplex: lower femoral vein, popliteal vein, and calf veins are not scanned due to patient's request to terminate the examination. otherwise no DVT

## 2019-05-23 NOTE — PROGRESS NOTE ADULT - ATTENDING COMMENTS
Patient was seen and examined by myself. Case was discussed with medicine NP in details. I have reviewed and agree with the plan as outlined above with edits where appropriate.      Vital Signs Last 24 Hrs  T(C): 36.6 (23 May 2019 20:13), Max: 37 (23 May 2019 06:06)  T(F): 97.9 (23 May 2019 20:13), Max: 98.6 (23 May 2019 06:06)  HR: 77 (23 May 2019 20:13) (73 - 77)  BP: 106/37 (23 May 2019 20:13) (106/37 - 119/63)  BP(mean): --  RR: 16 (23 May 2019 20:13) (14 - 16)  SpO2: 99% (23 May 2019 20:13) (97% - 99%)                          9.1    7.97  )-----------( 253      ( 23 May 2019 05:45 )             28.6     05-23    143  |  114<H>  |  35<H>  ----------------------------<  113<H>  4.6   |  21<L>  |  0.90    Ca    8.6      23 May 2019 05:45    Plan - clinically stable  Await placement  Continue local wound care.

## 2019-05-24 LAB
ANION GAP SERPL CALC-SCNC: 9 MMOL/L — SIGNIFICANT CHANGE UP (ref 5–17)
BUN SERPL-MCNC: 33 MG/DL — HIGH (ref 7–18)
CALCIUM SERPL-MCNC: 8.6 MG/DL — SIGNIFICANT CHANGE UP (ref 8.4–10.5)
CHLORIDE SERPL-SCNC: 113 MMOL/L — HIGH (ref 96–108)
CO2 SERPL-SCNC: 20 MMOL/L — LOW (ref 22–31)
CREAT SERPL-MCNC: 1.06 MG/DL — SIGNIFICANT CHANGE UP (ref 0.5–1.3)
GLUCOSE SERPL-MCNC: 98 MG/DL — SIGNIFICANT CHANGE UP (ref 70–99)
HCT VFR BLD CALC: 27.6 % — LOW (ref 34.5–45)
HGB BLD-MCNC: 9 G/DL — LOW (ref 11.5–15.5)
MCHC RBC-ENTMCNC: 32.6 GM/DL — SIGNIFICANT CHANGE UP (ref 32–36)
MCHC RBC-ENTMCNC: 34.4 PG — HIGH (ref 27–34)
MCV RBC AUTO: 105.3 FL — HIGH (ref 80–100)
NRBC # BLD: 0 /100 WBCS — SIGNIFICANT CHANGE UP (ref 0–0)
PLATELET # BLD AUTO: 253 K/UL — SIGNIFICANT CHANGE UP (ref 150–400)
POTASSIUM SERPL-MCNC: 4.3 MMOL/L — SIGNIFICANT CHANGE UP (ref 3.5–5.3)
POTASSIUM SERPL-SCNC: 4.3 MMOL/L — SIGNIFICANT CHANGE UP (ref 3.5–5.3)
RBC # BLD: 2.62 M/UL — LOW (ref 3.8–5.2)
RBC # FLD: 11.9 % — SIGNIFICANT CHANGE UP (ref 10.3–14.5)
SODIUM SERPL-SCNC: 142 MMOL/L — SIGNIFICANT CHANGE UP (ref 135–145)
WBC # BLD: 9.29 K/UL — SIGNIFICANT CHANGE UP (ref 3.8–10.5)
WBC # FLD AUTO: 9.29 K/UL — SIGNIFICANT CHANGE UP (ref 3.8–10.5)

## 2019-05-24 PROCEDURE — 99232 SBSQ HOSP IP/OBS MODERATE 35: CPT | Mod: GC

## 2019-05-24 RX ORDER — VANCOMYCIN HCL 1 G
1000 VIAL (EA) INTRAVENOUS ONCE
Refills: 0 | Status: COMPLETED | OUTPATIENT
Start: 2019-05-24 | End: 2019-05-24

## 2019-05-24 RX ADMIN — Medication 250 MILLIGRAM(S): at 12:53

## 2019-05-24 RX ADMIN — Medication 650 MILLIGRAM(S): at 14:16

## 2019-05-24 RX ADMIN — PREGABALIN 1000 MICROGRAM(S): 225 CAPSULE ORAL at 12:08

## 2019-05-24 RX ADMIN — DONEPEZIL HYDROCHLORIDE 10 MILLIGRAM(S): 10 TABLET, FILM COATED ORAL at 23:12

## 2019-05-24 RX ADMIN — GABAPENTIN 100 MILLIGRAM(S): 400 CAPSULE ORAL at 17:34

## 2019-05-24 RX ADMIN — PANTOPRAZOLE SODIUM 40 MILLIGRAM(S): 20 TABLET, DELAYED RELEASE ORAL at 12:08

## 2019-05-24 RX ADMIN — GABAPENTIN 100 MILLIGRAM(S): 400 CAPSULE ORAL at 06:18

## 2019-05-24 RX ADMIN — Medication 650 MILLIGRAM(S): at 15:33

## 2019-05-24 RX ADMIN — Medication 1 APPLICATION(S): at 12:15

## 2019-05-24 RX ADMIN — ENOXAPARIN SODIUM 30 MILLIGRAM(S): 100 INJECTION SUBCUTANEOUS at 12:08

## 2019-05-24 NOTE — PROGRESS NOTE ADULT - SUBJECTIVE AND OBJECTIVE BOX
89 y/o F with PMHx of Alzheimer's disease, HTN, and R breast cancer s/p  mastectomy 25 years ago came in with c/o L leg swelling and pain x 1 month. Patient states that the she had a small wound on her left heel since 1 month which has been worsening. It is associated with L foot erythema, L leg swelling and pain. Denies any traumatic event prior to this episode. Denies fever. She said the wound is black in color and is increasing in size. She went to see her PMD 1 week ago and was prescribed doxycycline without significant improvement.  Currently there is little improvement on IV Doxycycline. Foot still with erythema and warmth and is tender to touch.  NP Note discussed with  Primary Attending    Patient is a 89y old  Female who presents with a chief complaint of L leg swelling and pain (23 May 2019 12:10)      INTERVAL HPI/OVERNIGHT EVENTS: no new complaints    MEDICATIONS  (STANDING):  donepezil 10 milliGRAM(s) Oral at bedtime  enoxaparin Injectable 30 milliGRAM(s) SubCutaneous daily  gabapentin 100 milliGRAM(s) Oral two times a day  pantoprazole    Tablet 40 milliGRAM(s) Oral daily  povidone iodine 10% Solution 1 Application(s) Topical daily    MEDICATIONS  (PRN):  acetaminophen   Tablet .. 650 milliGRAM(s) Oral every 6 hours PRN Temp greater or equal to 38C (100.4F), Moderate Pain (4 - 6)      __________________________________________________  REVIEW OF SYSTEMS:    CONSTITUTIONAL: No fever,   EYES: no acute visual disturbances  NECK: No pain or stiffness  RESPIRATORY: No cough; No shortness of breath  CARDIOVASCULAR: No chest pain, no palpitations  GASTROINTESTINAL: No pain. No nausea or vomiting; No diarrhea   NEUROLOGICAL: No headache or numbness, no tremors  MUSCULOSKELETAL: left foot pain  GENITOURINARY: no dysuria, no frequency, no hesitancy  PSYCHIATRY: no depression , no anxiety  ALL OTHER  ROS negative        Vital Signs Last 24 Hrs  T(C): 37.2 (24 May 2019 05:25), Max: 37.2 (24 May 2019 05:25)  T(F): 98.9 (24 May 2019 05:25), Max: 98.9 (24 May 2019 05:25)  HR: 78 (24 May 2019 05:25) (77 - 78)  BP: 128/45 (24 May 2019 05:25) (106/37 - 128/45)  BP(mean): --  RR: 16 (24 May 2019 05:25) (16 - 16)  SpO2: 99% (24 May 2019 05:25) (99% - 99%)    ________________________________________________  PHYSICAL EXAM:  GENERAL: NAD  HEENT: Normocephalic;  conjunctivae and sclerae clear; moist mucous membranes;   NECK : supple  CHEST/LUNG: Clear to auscultation bilaterally  HEART: S1 S2  regular; no murmurs, gallops or rubs  ABDOMEN: Soft, Nontender, Nondistended; Bowel sounds present  EXTREMITIES :Left heel remains with intact black blister and erythema and remains warm  SKIN: warm and dry; no rash  NERVOUS SYSTEM:  Awake and alert; Oriented  to place, person     _________________________________________________  LABS:                        9.0    9.29  )-----------( 253      ( 24 May 2019 06:56 )             27.6     05-24    142  |  113<H>  |  33<H>  ----------------------------<  98  4.3   |  20<L>  |  1.06    Ca    8.6      24 May 2019 06:56          CAPILLARY BLOOD GLUCOSE            RADIOLOGY & ADDITIONAL TESTS:    Imaging Personally Reviewed:  YES  Consultant(s) Notes Reviewed:   YES  Care Discussed with Consultants : Dr Chance    Plan of care was discussed with patient and /or primary care giver; all questions and concerns were addressed and care was aligned with patient's wishes. NP Note discussed with  Primary Attending    89 y/o F with PMHx of Alzheimer's disease, HTN, and R breast cancer s/p  mastectomy 25 years ago came in with c/o L leg swelling and pain x 1 month. Patient states that the she had a small wound on her left heel since 1 month which has been worsening. It is associated with L foot erythema, L leg swelling and pain. Denies any traumatic event prior to this episode. Denies fever. She said the wound is black in color and is increasing in size. She went to see her PMD 1 week ago and was prescribed doxycycline without significant improvement.    Currently there is little improvement on doxycycline. Foot still with erythema and warmth and is tender to touch.    Patient is a 89y old  Female who presents with a chief complaint of L leg swelling and pain (23 May 2019 12:10)      INTERVAL HPI/OVERNIGHT EVENTS: no new complaints    MEDICATIONS  (STANDING):  donepezil 10 milliGRAM(s) Oral at bedtime  enoxaparin Injectable 30 milliGRAM(s) SubCutaneous daily  gabapentin 100 milliGRAM(s) Oral two times a day  pantoprazole    Tablet 40 milliGRAM(s) Oral daily  povidone iodine 10% Solution 1 Application(s) Topical daily    MEDICATIONS  (PRN):  acetaminophen   Tablet .. 650 milliGRAM(s) Oral every 6 hours PRN Temp greater or equal to 38C (100.4F), Moderate Pain (4 - 6)      __________________________________________________  REVIEW OF SYSTEMS:    CONSTITUTIONAL: No fever,   EYES: no acute visual disturbances  NECK: No pain or stiffness  RESPIRATORY: No cough; No shortness of breath  CARDIOVASCULAR: No chest pain, no palpitations  GASTROINTESTINAL: No pain. No nausea or vomiting; No diarrhea   NEUROLOGICAL: No headache or numbness, no tremors  MUSCULOSKELETAL: left foot pain+    ALL OTHER  ROS negative        Vital Signs Last 24 Hrs  T(C): 37.2 (24 May 2019 05:25), Max: 37.2 (24 May 2019 05:25)  T(F): 98.9 (24 May 2019 05:25), Max: 98.9 (24 May 2019 05:25)  HR: 78 (24 May 2019 05:25) (77 - 78)  BP: 128/45 (24 May 2019 05:25) (106/37 - 128/45)  BP(mean): --  RR: 16 (24 May 2019 05:25) (16 - 16)  SpO2: 99% (24 May 2019 05:25) (99% - 99%)    ________________________________________________  PHYSICAL EXAM:  GENERAL: NAD  HEENT: Normocephalic;  conjunctivae and sclerae clear; moist mucous membranes;   NECK : supple  CHEST/LUNG: Clear to auscultation bilaterally  HEART: S1 S2+  ABDOMEN: Soft, Nontender, Nondistended; Bowel sounds present  EXTREMITIES :Left heel remains with intact black blister; left foot erythema and increased  warmth  SKIN: warm and dry; no rash  NERVOUS SYSTEM:  Awake and alert;    _________________________________________________  LABS:                        9.0    9.29  )-----------( 253      ( 24 May 2019 06:56 )             27.6     05-24    142  |  113<H>  |  33<H>  ----------------------------<  98  4.3   |  20<L>  |  1.06    Ca    8.6      24 May 2019 06:56          Care Discussed with Consultants : Dr Chance    Plan of care was discussed with patient's son (primary care giver); all questions and concerns were addressed and care was aligned with patient's wishes.

## 2019-05-24 NOTE — PROGRESS NOTE ADULT - PROBLEM SELECTOR PLAN 1
WBC  trending up 9.2 . Vancomycin  1 gram given  wound care consult pending   Doppler was completed . - negative for DVT  pain control  Doppler reordered. Grand daughter to accompany her.  f/u blood cx  f/u procalcitonin.  Venous Duplex: lower femoral vein, popliteal vein, and calf veins are not scanned due to patient's request to terminate the examination. otherwise no DVT WBC  trending up 9.2 . Vancomycin  1 gram given  wound care consult pending   Doppler was completed . - negative for DVT  pain control

## 2019-05-24 NOTE — PROGRESS NOTE ADULT - PROBLEM SELECTOR PLAN 2
On losartan 100mg q day at home   Continue to hold and start when appropriate as /55 this AM  VS per routine  DASH diet On losartan 100mg q day at home   Continue to hold and start when appropriate as /55 this AM  monitor blood pressure  DASH diet

## 2019-05-24 NOTE — PROGRESS NOTE ADULT - ATTENDING COMMENTS
Patient was seen and examined by myself. Case was discussed with medicine NP staff in details. I have reviewed and agree with the plan as outlined above with edits where appropriate.    Vital Signs Last 24 Hrs  T(C): 36.6 (24 May 2019 14:15), Max: 37.2 (24 May 2019 05:25)  T(F): 97.9 (24 May 2019 14:15), Max: 98.9 (24 May 2019 05:25)  HR: 84 (24 May 2019 14:15) (77 - 84)  BP: 122/52 (24 May 2019 14:15) (106/37 - 128/45)  BP(mean): --  RR: 16 (24 May 2019 14:15) (16 - 16)  SpO2: 100% (24 May 2019 14:15) (99% - 100%)                          9.0    9.29  )-----------( 253      ( 24 May 2019 06:56 )             27.6       05-24    142  |  113<H>  |  33<H>  ----------------------------<  98  4.3   |  20<L>  |  1.06    Ca    8.6      24 May 2019 06:56    A/P 89 y/o F with   1. cellulitis of the foot  2. blisyer left heel  3 dementia  4. anemia  5. essential HTN Patient was seen and examined by myself. Case was discussed with medicine NP staff in details. I have reviewed and agree with the plan as outlined above with edits where appropriate.    Vital Signs Last 24 Hrs  T(C): 36.6 (24 May 2019 14:15), Max: 37.2 (24 May 2019 05:25)  T(F): 97.9 (24 May 2019 14:15), Max: 98.9 (24 May 2019 05:25)  HR: 84 (24 May 2019 14:15) (77 - 84)  BP: 122/52 (24 May 2019 14:15) (106/37 - 128/45)  BP(mean): --  RR: 16 (24 May 2019 14:15) (16 - 16)  SpO2: 100% (24 May 2019 14:15) (99% - 100%)                          9.0    9.29  )-----------( 253      ( 24 May 2019 06:56 )             27.6       05-24    142  |  113<H>  |  33<H>  ----------------------------<  98  4.3   |  20<L>  |  1.06    Ca    8.6      24 May 2019 06:56    A/P 89 y/o F with   1. cellulitis of the foot  2. blister left heel  3 dementia  4. anemia  5. essential HTN    - Treat with IV Vancomycin  - local wound care  - elevated heel  - supportive measures  - pending dispo- needs long-term care placement.  Other plan as outlined above.

## 2019-05-25 LAB
ANION GAP SERPL CALC-SCNC: 7 MMOL/L — SIGNIFICANT CHANGE UP (ref 5–17)
BUN SERPL-MCNC: 29 MG/DL — HIGH (ref 7–18)
CALCIUM SERPL-MCNC: 8.5 MG/DL — SIGNIFICANT CHANGE UP (ref 8.4–10.5)
CHLORIDE SERPL-SCNC: 115 MMOL/L — HIGH (ref 96–108)
CO2 SERPL-SCNC: 21 MMOL/L — LOW (ref 22–31)
CREAT SERPL-MCNC: 1.05 MG/DL — SIGNIFICANT CHANGE UP (ref 0.5–1.3)
GLUCOSE SERPL-MCNC: 108 MG/DL — HIGH (ref 70–99)
HCT VFR BLD CALC: 27.4 % — LOW (ref 34.5–45)
HGB BLD-MCNC: 8.6 G/DL — LOW (ref 11.5–15.5)
MCHC RBC-ENTMCNC: 31.4 GM/DL — LOW (ref 32–36)
MCHC RBC-ENTMCNC: 33.9 PG — SIGNIFICANT CHANGE UP (ref 27–34)
MCV RBC AUTO: 107.9 FL — HIGH (ref 80–100)
NRBC # BLD: 0 /100 WBCS — SIGNIFICANT CHANGE UP (ref 0–0)
PLATELET # BLD AUTO: 231 K/UL — SIGNIFICANT CHANGE UP (ref 150–400)
POTASSIUM SERPL-MCNC: 4.7 MMOL/L — SIGNIFICANT CHANGE UP (ref 3.5–5.3)
POTASSIUM SERPL-SCNC: 4.7 MMOL/L — SIGNIFICANT CHANGE UP (ref 3.5–5.3)
RBC # BLD: 2.54 M/UL — LOW (ref 3.8–5.2)
RBC # FLD: 12 % — SIGNIFICANT CHANGE UP (ref 10.3–14.5)
SODIUM SERPL-SCNC: 143 MMOL/L — SIGNIFICANT CHANGE UP (ref 135–145)
WBC # BLD: 8.63 K/UL — SIGNIFICANT CHANGE UP (ref 3.8–10.5)
WBC # FLD AUTO: 8.63 K/UL — SIGNIFICANT CHANGE UP (ref 3.8–10.5)

## 2019-05-25 PROCEDURE — 99233 SBSQ HOSP IP/OBS HIGH 50: CPT

## 2019-05-25 PROCEDURE — 73610 X-RAY EXAM OF ANKLE: CPT | Mod: 26,LT

## 2019-05-25 RX ORDER — TRAMADOL HYDROCHLORIDE 50 MG/1
25 TABLET ORAL EVERY 6 HOURS
Refills: 0 | Status: DISCONTINUED | OUTPATIENT
Start: 2019-05-25 | End: 2019-05-30

## 2019-05-25 RX ORDER — VANCOMYCIN HCL 1 G
750 VIAL (EA) INTRAVENOUS EVERY 24 HOURS
Refills: 0 | Status: COMPLETED | OUTPATIENT
Start: 2019-05-25 | End: 2019-05-26

## 2019-05-25 RX ORDER — LIDOCAINE 4 G/100G
1 CREAM TOPICAL DAILY
Refills: 0 | Status: DISCONTINUED | OUTPATIENT
Start: 2019-05-25 | End: 2019-05-30

## 2019-05-25 RX ADMIN — GABAPENTIN 100 MILLIGRAM(S): 400 CAPSULE ORAL at 06:45

## 2019-05-25 RX ADMIN — ENOXAPARIN SODIUM 30 MILLIGRAM(S): 100 INJECTION SUBCUTANEOUS at 12:25

## 2019-05-25 RX ADMIN — PANTOPRAZOLE SODIUM 40 MILLIGRAM(S): 20 TABLET, DELAYED RELEASE ORAL at 12:24

## 2019-05-25 RX ADMIN — GABAPENTIN 100 MILLIGRAM(S): 400 CAPSULE ORAL at 17:49

## 2019-05-25 RX ADMIN — DONEPEZIL HYDROCHLORIDE 10 MILLIGRAM(S): 10 TABLET, FILM COATED ORAL at 21:35

## 2019-05-25 RX ADMIN — LIDOCAINE 1 PATCH: 4 CREAM TOPICAL at 12:24

## 2019-05-25 RX ADMIN — Medication 1 APPLICATION(S): at 15:07

## 2019-05-25 RX ADMIN — TRAMADOL HYDROCHLORIDE 25 MILLIGRAM(S): 50 TABLET ORAL at 15:57

## 2019-05-25 RX ADMIN — TRAMADOL HYDROCHLORIDE 25 MILLIGRAM(S): 50 TABLET ORAL at 15:48

## 2019-05-25 RX ADMIN — LIDOCAINE 1 PATCH: 4 CREAM TOPICAL at 19:30

## 2019-05-25 RX ADMIN — Medication 250 MILLIGRAM(S): at 12:25

## 2019-05-25 NOTE — PROGRESS NOTE ADULT - SUBJECTIVE AND OBJECTIVE BOX
MEDICAL ATTENDING NOTE    Patient is a 89y old  Female who presents with a chief complaint of L leg swelling and pain (24 May 2019 14:23)      INTERVAL HPI/OVERNIGHT EVENTS: no new complaints    MEDICATIONS  (STANDING):  donepezil 10 milliGRAM(s) Oral at bedtime  enoxaparin Injectable 30 milliGRAM(s) SubCutaneous daily  gabapentin 100 milliGRAM(s) Oral two times a day  pantoprazole    Tablet 40 milliGRAM(s) Oral daily  povidone iodine 10% Solution 1 Application(s) Topical daily  vancomycin  IVPB 750 milliGRAM(s) IV Intermittent every 24 hours    MEDICATIONS  (PRN):  acetaminophen   Tablet .. 650 milliGRAM(s) Oral every 6 hours PRN Temp greater or equal to 38C (100.4F), Moderate Pain (4 - 6)      __________________________________________________  REVIEW OF SYSTEMS:    CONSTITUTIONAL: No fever,   EYES: no acute visual disturbances  NECK: No pain or stiffness  RESPIRATORY: No cough; No shortness of breath  CARDIOVASCULAR: No chest pain, no palpitations  GASTROINTESTINAL: No pain. No nausea or vomiting; No diarrhea   NEUROLOGICAL: No headache or numbness, no tremors  MUSCULOSKELETAL: No joint pain, no muscle pain  GENITOURINARY: no dysuria, no frequency, no hesitancy  PSYCHIATRY: no depression , no anxiety  ALL OTHER  ROS negative        Vital Signs Last 24 Hrs  T(C): 36.8 (25 May 2019 05:27), Max: 36.8 (25 May 2019 05:27)  T(F): 98.3 (25 May 2019 05:27), Max: 98.3 (25 May 2019 05:27)  HR: 80 (25 May 2019 05:27) (80 - 84)  BP: 125/43 (25 May 2019 05:27) (111/38 - 125/43)  BP(mean): --  RR: 16 (25 May 2019 05:27) (16 - 16)  SpO2: 99% (25 May 2019 05:27) (99% - 100%)    ________________________________________________  PHYSICAL EXAM:  GENERAL: NAD  HEENT: Normocephalic;  conjunctivae and sclerae clear; moist mucous membranes;   NECK : supple  CHEST/LUNG: Clear to auscultation bilaterally with good air entry   HEART: S1 S2  regular; no murmurs, gallops or rubs  ABDOMEN: Soft, Nontender, Nondistended; Bowel sounds present  EXTREMITIES: no cyanosis; no edema; no calf tenderness  SKIN: warm and dry; no rash  NERVOUS SYSTEM:  Awake and alert; Oriented  to place, person and time ; no new deficits    _________________________________________________  LABS:                        8.6    8.63  )-----------( 231      ( 25 May 2019 05:58 )             27.4     05-25    143  |  115<H>  |  29<H>  ----------------------------<  108<H>  4.7   |  21<L>  |  1.05    Ca    8.5      25 May 2019 05:58          CAPILLARY BLOOD GLUCOSE            RADIOLOGY & ADDITIONAL TESTS:    Imaging Personally Reviewed:      Consultant(s) Notes Reviewed:       Care Discussed with Consultants :     Plan of care was discussed with patient and /or primary care giver; all questions and concerns were addressed and care was aligned with patient's wishes. MEDICAL ATTENDING NOTE    Patient is a 89y old  Female who presents with a chief complaint of L leg swelling and pain (24 May 2019 14:23)      INTERVAL HPI/OVERNIGHT EVENTS: Pt seen with daughter at bedside. Spoke with granddaughter over phone. Says her grandmother is still in significant pain.      MEDICATIONS  (STANDING):  donepezil 10 milliGRAM(s) Oral at bedtime  enoxaparin Injectable 30 milliGRAM(s) SubCutaneous daily  gabapentin 100 milliGRAM(s) Oral two times a day  pantoprazole    Tablet 40 milliGRAM(s) Oral daily  povidone iodine 10% Solution 1 Application(s) Topical daily  vancomycin  IVPB 750 milliGRAM(s) IV Intermittent every 24 hours    MEDICATIONS  (PRN):  acetaminophen   Tablet .. 650 milliGRAM(s) Oral every 6 hours PRN Temp greater or equal to 38C (100.4F), Moderate Pain (4 - 6)      __________________________________________________  REVIEW OF SYSTEMS:    CONSTITUTIONAL: No fever  RESPIRATORY: No cough; No shortness of breath  CARDIOVASCULAR: No chest pain, no palpitations  MUSCULOSKELETAL: + L ankle pain        Vital Signs Last 24 Hrs  T(C): 36.8 (25 May 2019 05:27), Max: 36.8 (25 May 2019 05:27)  T(F): 98.3 (25 May 2019 05:27), Max: 98.3 (25 May 2019 05:27)  HR: 80 (25 May 2019 05:27) (80 - 84)  BP: 125/43 (25 May 2019 05:27) (111/38 - 125/43)  BP(mean): --  RR: 16 (25 May 2019 05:27) (16 - 16)  SpO2: 99% (25 May 2019 05:27) (99% - 100%)    ________________________________________________  PHYSICAL EXAM:  GENERAL: Patient on obvious pain/distress with manipulation of left foot  HEENT: Normocephalic;  conjunctivae and sclerae clear; moist mucous membranes;   NECK : supple  CHEST/LUNG: Clear to auscultation bilaterally with good air entry   HEART: S1 S2  regular; no murmurs, gallops or rubs  ABDOMEN: Soft, Nontender, Nondistended; Bowel sounds present  EXTREMITIES: + L foot erythema of dorsum, no swelling noted but TTP which limits ROM; Black eschar of left heel    _________________________________________________  LABS:                        8.6    8.63  )-----------( 231      ( 25 May 2019 05:58 )             27.4     05-25    143  |  115<H>  |  29<H>  ----------------------------<  108<H>  4.7   |  21<L>  |  1.05    Ca    8.5      25 May 2019 05:58          CAPILLARY BLOOD GLUCOSE            RADIOLOGY & ADDITIONAL TESTS:    Imaging Personally Reviewed:  Yes    Consultant(s) Notes Reviewed:  Yes       Plan of care was discussed with patient and /or primary care giver; all questions and concerns were addressed and care was aligned with patient's wishes. MEDICAL ATTENDING NOTE    Patient is a 89y old  Female who presents with a chief complaint of L leg swelling and pain (24 May 2019 14:23)      INTERVAL HPI/OVERNIGHT EVENTS: Pt seen with daughter at bedside. Spoke with granddaughter over phone. Says her grandmother still has significant pain in the left lower extremity.       MEDICATIONS  (STANDING):  donepezil 10 milliGRAM(s) Oral at bedtime  enoxaparin Injectable 30 milliGRAM(s) SubCutaneous daily  gabapentin 100 milliGRAM(s) Oral two times a day  pantoprazole    Tablet 40 milliGRAM(s) Oral daily  povidone iodine 10% Solution 1 Application(s) Topical daily  vancomycin  IVPB 750 milliGRAM(s) IV Intermittent every 24 hours    MEDICATIONS  (PRN):  acetaminophen   Tablet .. 650 milliGRAM(s) Oral every 6 hours PRN Temp greater or equal to 38C (100.4F), Moderate Pain (4 - 6)      __________________________________________________  REVIEW OF SYSTEMS:    CONSTITUTIONAL: No fever  RESPIRATORY: No cough; No shortness of breath  CARDIOVASCULAR: No chest pain, no palpitations  MUSCULOSKELETAL: + L ankle pain        Vital Signs Last 24 Hrs  T(C): 36.8 (25 May 2019 05:27), Max: 36.8 (25 May 2019 05:27)  T(F): 98.3 (25 May 2019 05:27), Max: 98.3 (25 May 2019 05:27)  HR: 80 (25 May 2019 05:27) (80 - 84)  BP: 125/43 (25 May 2019 05:27) (111/38 - 125/43)  BP(mean): --  RR: 16 (25 May 2019 05:27) (16 - 16)  SpO2: 99% (25 May 2019 05:27) (99% - 100%)    ________________________________________________  PHYSICAL EXAM:  GENERAL: Patient on obvious pain/distress with manipulation of left foot  HEENT: Normocephalic;  conjunctivae and sclerae clear; moist mucous membranes;   NECK : supple  CHEST/LUNG: Clear to auscultation bilaterally with good air entry   HEART: S1 S2  regular; no murmurs, gallops or rubs  ABDOMEN: Soft, Nontender, Nondistended; Bowel sounds present  EXTREMITIES: + L foot erythema of dorsum, no swelling noted but TTP which limits ROM; Black eschar of left heel    _________________________________________________  LABS:                        8.6    8.63  )-----------( 231      ( 25 May 2019 05:58 )             27.4     05-25    143  |  115<H>  |  29<H>  ----------------------------<  108<H>  4.7   |  21<L>  |  1.05    Ca    8.5      25 May 2019 05:58          CAPILLARY BLOOD GLUCOSE            RADIOLOGY & ADDITIONAL TESTS:    Imaging Personally Reviewed:  Yes    Consultant(s) Notes Reviewed:  Yes       Plan of care was discussed with patient and /or primary care giver; all questions and concerns were addressed and care was aligned with patient's wishes.

## 2019-05-25 NOTE — PROGRESS NOTE ADULT - PROBLEM SELECTOR PLAN 2
On losartan 100mg q day at home   Continue to hold and start when appropriate as /55 this AM  monitor blood pressure  DASH diet Controlled off meds

## 2019-05-25 NOTE — PROGRESS NOTE ADULT - PROBLEM SELECTOR PLAN 1
WBC  trending up 9.2 . Vancomycin  1 gram given  wound care consult pending   Doppler was completed . - negative for DVT  pain control CW vancomycin renally dosed  Will obtain another ankle series xray, previous xray recommended repeat as evaluation was limited due to positioning  Will also obtain ESR, however, there is low suspicion for osteomyelitis  Pain control - added Tramadol 25mg [id number] as needed, lidocaine patch, gabapentin  Doppler negative for DVT

## 2019-05-26 LAB
ANION GAP SERPL CALC-SCNC: 7 MMOL/L — SIGNIFICANT CHANGE UP (ref 5–17)
BUN SERPL-MCNC: 31 MG/DL — HIGH (ref 7–18)
CALCIUM SERPL-MCNC: 8.5 MG/DL — SIGNIFICANT CHANGE UP (ref 8.4–10.5)
CHLORIDE SERPL-SCNC: 112 MMOL/L — HIGH (ref 96–108)
CO2 SERPL-SCNC: 22 MMOL/L — SIGNIFICANT CHANGE UP (ref 22–31)
CREAT SERPL-MCNC: 1.08 MG/DL — SIGNIFICANT CHANGE UP (ref 0.5–1.3)
ERYTHROCYTE [SEDIMENTATION RATE] IN BLOOD: 73 MM/HR — HIGH (ref 0–20)
GLUCOSE SERPL-MCNC: 104 MG/DL — HIGH (ref 70–99)
HCT VFR BLD CALC: 30 % — LOW (ref 34.5–45)
HGB BLD-MCNC: 9.6 G/DL — LOW (ref 11.5–15.5)
MAGNESIUM SERPL-MCNC: 2.4 MG/DL — SIGNIFICANT CHANGE UP (ref 1.6–2.6)
MCHC RBC-ENTMCNC: 32 GM/DL — SIGNIFICANT CHANGE UP (ref 32–36)
MCHC RBC-ENTMCNC: 34.3 PG — HIGH (ref 27–34)
MCV RBC AUTO: 107.1 FL — HIGH (ref 80–100)
NRBC # BLD: 0 /100 WBCS — SIGNIFICANT CHANGE UP (ref 0–0)
PLATELET # BLD AUTO: 248 K/UL — SIGNIFICANT CHANGE UP (ref 150–400)
POTASSIUM SERPL-MCNC: 4.6 MMOL/L — SIGNIFICANT CHANGE UP (ref 3.5–5.3)
POTASSIUM SERPL-SCNC: 4.6 MMOL/L — SIGNIFICANT CHANGE UP (ref 3.5–5.3)
RBC # BLD: 2.8 M/UL — LOW (ref 3.8–5.2)
RBC # FLD: 11.9 % — SIGNIFICANT CHANGE UP (ref 10.3–14.5)
SODIUM SERPL-SCNC: 141 MMOL/L — SIGNIFICANT CHANGE UP (ref 135–145)
VANCOMYCIN TROUGH SERPL-MCNC: 10.3 UG/ML — SIGNIFICANT CHANGE UP (ref 10–20)
WBC # BLD: 7.08 K/UL — SIGNIFICANT CHANGE UP (ref 3.8–10.5)
WBC # FLD AUTO: 7.08 K/UL — SIGNIFICANT CHANGE UP (ref 3.8–10.5)

## 2019-05-26 PROCEDURE — 99233 SBSQ HOSP IP/OBS HIGH 50: CPT

## 2019-05-26 RX ORDER — VANCOMYCIN HCL 1 G
750 VIAL (EA) INTRAVENOUS EVERY 24 HOURS
Refills: 0 | Status: COMPLETED | OUTPATIENT
Start: 2019-05-27 | End: 2019-05-28

## 2019-05-26 RX ADMIN — TRAMADOL HYDROCHLORIDE 25 MILLIGRAM(S): 50 TABLET ORAL at 15:18

## 2019-05-26 RX ADMIN — LIDOCAINE 1 PATCH: 4 CREAM TOPICAL at 19:00

## 2019-05-26 RX ADMIN — GABAPENTIN 100 MILLIGRAM(S): 400 CAPSULE ORAL at 18:06

## 2019-05-26 RX ADMIN — TRAMADOL HYDROCHLORIDE 25 MILLIGRAM(S): 50 TABLET ORAL at 18:03

## 2019-05-26 RX ADMIN — Medication 1 APPLICATION(S): at 18:08

## 2019-05-26 RX ADMIN — GABAPENTIN 100 MILLIGRAM(S): 400 CAPSULE ORAL at 08:32

## 2019-05-26 RX ADMIN — Medication 250 MILLIGRAM(S): at 15:42

## 2019-05-26 RX ADMIN — LIDOCAINE 1 PATCH: 4 CREAM TOPICAL at 00:00

## 2019-05-26 RX ADMIN — PANTOPRAZOLE SODIUM 40 MILLIGRAM(S): 20 TABLET, DELAYED RELEASE ORAL at 15:06

## 2019-05-26 RX ADMIN — ENOXAPARIN SODIUM 30 MILLIGRAM(S): 100 INJECTION SUBCUTANEOUS at 14:55

## 2019-05-26 RX ADMIN — LIDOCAINE 1 PATCH: 4 CREAM TOPICAL at 14:55

## 2019-05-26 RX ADMIN — DONEPEZIL HYDROCHLORIDE 10 MILLIGRAM(S): 10 TABLET, FILM COATED ORAL at 23:46

## 2019-05-26 NOTE — PROGRESS NOTE ADULT - SUBJECTIVE AND OBJECTIVE BOX
Patient is a 89y old  Female who presents with a chief complaint of L leg swelling and pain (25 May 2019 09:46)    HPI:  89 y/o F with PMHx of Alzheimer's disease, HTN, and R breast cancer s/p  mastectomy 25 years ago came in with c/o L leg swelling and pain x 1 month. Patient states that the she had a small wound on her left heel since 1 month which has been worsening. It is associated with L foot erythema, L leg swelling and pain. Denies any traumatic event prior to this episode. Denies fever. She said the wound is black in color and is increasing in size. She went to see her PMD 1 week ago and was prescribed doxycycline without significant improvement. No other complaints at this time.  SH: denies smoking or alcohol use. Lives by herself, has A 24/7. Wheelchair bound at baseline (16 May 2019 05:49)    Today:  Pt c.o bilateral lower ext pain. pain is chronic as per family. Pt is bed bound due to hip disorder.   No sob, no chest pain, no fevers.       PAST MEDICAL & SURGICAL HISTORY:  Hypertension    MEDICATIONS  (STANDING):  donepezil 10 milliGRAM(s) Oral at bedtime  enoxaparin Injectable 30 milliGRAM(s) SubCutaneous daily  gabapentin 100 milliGRAM(s) Oral two times a day  lidocaine   Patch 1 Patch Transdermal daily  pantoprazole    Tablet 40 milliGRAM(s) Oral daily  povidone iodine 10% Solution 1 Application(s) Topical daily    MEDICATIONS  (PRN):  acetaminophen   Tablet .. 650 milliGRAM(s) Oral every 6 hours PRN Temp greater or equal to 38C (100.4F), Moderate Pain (4 - 6)  traMADol 25 milliGRAM(s) Oral every 6 hours PRN Severe Pain (7 - 10)      Social History:  etoh  tobacco   drug use    EXAM:  Vital Signs Last 24 Hrs  T(C): 36.7 (26 May 2019 21:36), Max: 36.7 (26 May 2019 21:36)  T(F): 98.1 (26 May 2019 21:36), Max: 98.1 (26 May 2019 21:36)  HR: 68 (26 May 2019 21:36) (68 - 74)  BP: 117/39 (26 May 2019 21:36) (106/31 - 117/39)  BP(mean): --  RR: 16 (26 May 2019 21:36) (16 - 18)  SpO2: 99% (26 May 2019 21:36) (99% - 99%)      PHYSICAL EXAM:  Constitutional: awake sleeping in stretcher chair.   Eyes: eomi, perrla.  ENMT: patent nares, moist mucus membranes  Neck: supple  Breasts: deferred  Back: non tender. no scoliosis.   Respiratory: bilaterally clear to auscultation, no wheezing, no rhonchi, no crackles, no decreased air entry  Cardiovascular: s1s2, rrr, no murmurs.   Gastrointestinal: soft, non tender, +bowel sounds, no rebound, no guarding.   Genitourinary: deferred  Rectal: deferred   Extremities: no edema.   Vascular:   Neurological: alert and oriented to person, date and place.   Skin: intact no rash, warm to touch.   Lymph Nodes:  Musculoskeletal: moves all 4 extremities  Psychiatric: no homicidal, suicidal ideation. appropriate affect.   Heme/Lymph:   LABS:                        9.6    7.08  )-----------( 248      ( 26 May 2019 06:58 )             30.0     05-26    141  |  112<H>  |  31<H>  ----------------------------<  104<H>  4.6   |  22  |  1.08    Ca    8.5      26 May 2019 06:58  Mg     2.4     05-26              Chart reviewed.   Labs reviewed.  Imaging reviewed.   Plan discussed with consultants. Patient is a 89y old  Female who presents with a chief complaint of L leg swelling and pain (25 May 2019 09:46)    HPI:  89 y/o F with PMHx of Alzheimer's disease, HTN, and R breast cancer s/p  mastectomy 25 years ago came in with c/o L leg swelling and pain x 1 month. Patient states that the she had a small wound on her left heel since 1 month which has been worsening. It is associated with L foot erythema, L leg swelling and pain. Denies any traumatic event prior to this episode. Denies fever. She said the wound is black in color and is increasing in size. She went to see her PMD 1 week ago and was prescribed doxycycline without significant improvement. No other complaints at this time.  SH: denies smoking or alcohol use. Lives by herself, has A 24/7. Wheelchair bound at baseline (16 May 2019 05:49)    Today:  Pt c.o bilateral lower ext pain. pain is chronic as per family. Pt is bed bound due to hip disorder.   No sob, no chest pain, no fevers.     PAST MEDICAL & SURGICAL HISTORY:  Hypertension    MEDICATIONS  (STANDING):  donepezil 10 milliGRAM(s) Oral at bedtime  enoxaparin Injectable 30 milliGRAM(s) SubCutaneous daily  gabapentin 100 milliGRAM(s) Oral two times a day  lidocaine   Patch 1 Patch Transdermal daily  pantoprazole    Tablet 40 milliGRAM(s) Oral daily  povidone iodine 10% Solution 1 Application(s) Topical daily    MEDICATIONS  (PRN):  acetaminophen   Tablet .. 650 milliGRAM(s) Oral every 6 hours PRN Temp greater or equal to 38C (100.4F), Moderate Pain (4 - 6)  traMADol 25 milliGRAM(s) Oral every 6 hours PRN Severe Pain (7 - 10)    EXAM:  Vital Signs Last 24 Hrs  T(C): 36.7 (26 May 2019 21:36), Max: 36.7 (26 May 2019 21:36)  T(F): 98.1 (26 May 2019 21:36), Max: 98.1 (26 May 2019 21:36)  HR: 68 (26 May 2019 21:36) (68 - 74)  BP: 117/39 (26 May 2019 21:36) (106/31 - 117/39)  BP(mean): --  RR: 16 (26 May 2019 21:36) (16 - 18)  SpO2: 99% (26 May 2019 21:36) (99% - 99%)    PHYSICAL EXAM:  Constitutional: awake laying in bed. nad. family at bedside.   Neck: supple  Respiratory: bilaterally clear to auscultation, no wheezing, no rhonchi, no crackles, no decreased air entry  Cardiovascular: s1s2, rrr, no murmurs.   Gastrointestinal: soft, non tender, +bowel sounds, no rebound, no guarding.   Extremities: no edema. left heel with dressing.  non tender to touch. left foot dorsum with erythema.   Neurological: alert   Skin: intact no rash, warm to touch.   Musculoskeletal: moves all 4 extremities  Psychiatric: appropriate affect.     LABS:                        9.6    7.08  )-----------( 248      ( 26 May 2019 06:58 )             30.0     05-26    141  |  112<H>  |  31<H>  ----------------------------<  104<H>  4.6   |  22  |  1.08    Ca    8.5      26 May 2019 06:58  Mg     2.4     05-26    Chart reviewed.   Labs reviewed.  Imaging reviewed.   Plan discussed with consultants.

## 2019-05-27 DIAGNOSIS — S90.822A BLISTER (NONTHERMAL), LEFT FOOT, INITIAL ENCOUNTER: ICD-10-CM

## 2019-05-27 LAB
ANION GAP SERPL CALC-SCNC: 7 MMOL/L — SIGNIFICANT CHANGE UP (ref 5–17)
BUN SERPL-MCNC: 32 MG/DL — HIGH (ref 7–18)
CALCIUM SERPL-MCNC: 8.4 MG/DL — SIGNIFICANT CHANGE UP (ref 8.4–10.5)
CHLORIDE SERPL-SCNC: 113 MMOL/L — HIGH (ref 96–108)
CO2 SERPL-SCNC: 22 MMOL/L — SIGNIFICANT CHANGE UP (ref 22–31)
CREAT SERPL-MCNC: 1.06 MG/DL — SIGNIFICANT CHANGE UP (ref 0.5–1.3)
GLUCOSE SERPL-MCNC: 105 MG/DL — HIGH (ref 70–99)
HCT VFR BLD CALC: 29.3 % — LOW (ref 34.5–45)
HGB BLD-MCNC: 9.2 G/DL — LOW (ref 11.5–15.5)
MCHC RBC-ENTMCNC: 31.4 GM/DL — LOW (ref 32–36)
MCHC RBC-ENTMCNC: 33.5 PG — SIGNIFICANT CHANGE UP (ref 27–34)
MCV RBC AUTO: 106.5 FL — HIGH (ref 80–100)
NRBC # BLD: 0 /100 WBCS — SIGNIFICANT CHANGE UP (ref 0–0)
PLATELET # BLD AUTO: 239 K/UL — SIGNIFICANT CHANGE UP (ref 150–400)
POTASSIUM SERPL-MCNC: 4.3 MMOL/L — SIGNIFICANT CHANGE UP (ref 3.5–5.3)
POTASSIUM SERPL-SCNC: 4.3 MMOL/L — SIGNIFICANT CHANGE UP (ref 3.5–5.3)
RBC # BLD: 2.75 M/UL — LOW (ref 3.8–5.2)
RBC # FLD: 12.1 % — SIGNIFICANT CHANGE UP (ref 10.3–14.5)
SODIUM SERPL-SCNC: 142 MMOL/L — SIGNIFICANT CHANGE UP (ref 135–145)
WBC # BLD: 7.06 K/UL — SIGNIFICANT CHANGE UP (ref 3.8–10.5)
WBC # FLD AUTO: 7.06 K/UL — SIGNIFICANT CHANGE UP (ref 3.8–10.5)

## 2019-05-27 PROCEDURE — 99232 SBSQ HOSP IP/OBS MODERATE 35: CPT | Mod: GC

## 2019-05-27 RX ADMIN — LIDOCAINE 1 PATCH: 4 CREAM TOPICAL at 12:11

## 2019-05-27 RX ADMIN — GABAPENTIN 100 MILLIGRAM(S): 400 CAPSULE ORAL at 06:08

## 2019-05-27 RX ADMIN — LIDOCAINE 1 PATCH: 4 CREAM TOPICAL at 21:02

## 2019-05-27 RX ADMIN — Medication 1 APPLICATION(S): at 12:14

## 2019-05-27 RX ADMIN — DONEPEZIL HYDROCHLORIDE 10 MILLIGRAM(S): 10 TABLET, FILM COATED ORAL at 21:37

## 2019-05-27 RX ADMIN — TRAMADOL HYDROCHLORIDE 25 MILLIGRAM(S): 50 TABLET ORAL at 18:43

## 2019-05-27 RX ADMIN — ENOXAPARIN SODIUM 30 MILLIGRAM(S): 100 INJECTION SUBCUTANEOUS at 18:40

## 2019-05-27 RX ADMIN — Medication 250 MILLIGRAM(S): at 00:20

## 2019-05-27 RX ADMIN — TRAMADOL HYDROCHLORIDE 25 MILLIGRAM(S): 50 TABLET ORAL at 19:35

## 2019-05-27 RX ADMIN — LIDOCAINE 1 PATCH: 4 CREAM TOPICAL at 02:01

## 2019-05-27 RX ADMIN — PANTOPRAZOLE SODIUM 40 MILLIGRAM(S): 20 TABLET, DELAYED RELEASE ORAL at 12:10

## 2019-05-27 RX ADMIN — GABAPENTIN 100 MILLIGRAM(S): 400 CAPSULE ORAL at 18:40

## 2019-05-27 NOTE — PROGRESS NOTE ADULT - PROBLEM SELECTOR PLAN 1
Continue with IV Vancomycin renally dosed  Continue Tramadol , lidocaine patch and gabapentin for pain control  Doppler negative for DVT

## 2019-05-27 NOTE — PROGRESS NOTE ADULT - SUBJECTIVE AND OBJECTIVE BOX
MEDICAL ATTENDING NOTE    Patient is a 89y old  Female who presents with a chief complaint of L leg swelling and pain (26 May 2019 22:57)      INTERVAL HPI/OVERNIGHT EVENTS: no new complaints    MEDICATIONS  (STANDING):  donepezil 10 milliGRAM(s) Oral at bedtime  enoxaparin Injectable 30 milliGRAM(s) SubCutaneous daily  gabapentin 100 milliGRAM(s) Oral two times a day  lidocaine   Patch 1 Patch Transdermal daily  pantoprazole    Tablet 40 milliGRAM(s) Oral daily  povidone iodine 10% Solution 1 Application(s) Topical daily  vancomycin  IVPB 750 milliGRAM(s) IV Intermittent every 24 hours    MEDICATIONS  (PRN):  acetaminophen   Tablet .. 650 milliGRAM(s) Oral every 6 hours PRN Temp greater or equal to 38C (100.4F), Moderate Pain (4 - 6)  traMADol 25 milliGRAM(s) Oral every 6 hours PRN Severe Pain (7 - 10)      __________________________________________________  ----------------------------------------------------------------------------------  REVIEW OF SYSTEMS: no fever, no SOB, No Chest pain; feels well      Vital Signs Last 24 Hrs  T(C): 37.3 (27 May 2019 15:03), Max: 37.3 (27 May 2019 15:03)  T(F): 99.2 (27 May 2019 15:03), Max: 99.2 (27 May 2019 15:03)  HR: 72 (27 May 2019 15:03) (68 - 72)  BP: 108/36 (27 May 2019 15:03) (98/80 - 117/39)  BP(mean): --  RR: 18 (27 May 2019 15:03) (16 - 18)  SpO2: 98% (27 May 2019 15:03) (98% - 100%)    _________________  PHYSICAL EXAM:  ---------------------------   NAD; Normocephalic;   LUNGS - no wheezing  HEART: S1 S2+   ABDOMEN: Soft, Nontender, non distended  EXTREMITIES: no cyanosis; no edema  NERVOUS SYSTEM:  Awake and alert; no new deficits    _________________________________________________  LABS:                        9.2    7.06  )-----------( 239      ( 27 May 2019 06:46 )             29.3     05-27    142  |  113<H>  |  32<H>  ----------------------------<  105<H>  4.3   |  22  |  1.06    Ca    8.4      27 May 2019 06:46  Mg     2.4     05-26          CAPILLARY BLOOD GLUCOSE                Care Discussed with Consultants :     Plan of care was discussed with patient ; all questions and concerns were addressed and care was aligned with patient's wishes.  Patient has been advised to follow up with PMD upon discharge from the hospital.  Discharge plans discussed with nursing staff , case manger and .

## 2019-05-28 LAB
ANION GAP SERPL CALC-SCNC: 8 MMOL/L — SIGNIFICANT CHANGE UP (ref 5–17)
BUN SERPL-MCNC: 32 MG/DL — HIGH (ref 7–18)
CALCIUM SERPL-MCNC: 8.5 MG/DL — SIGNIFICANT CHANGE UP (ref 8.4–10.5)
CHLORIDE SERPL-SCNC: 111 MMOL/L — HIGH (ref 96–108)
CO2 SERPL-SCNC: 22 MMOL/L — SIGNIFICANT CHANGE UP (ref 22–31)
CREAT SERPL-MCNC: 1.13 MG/DL — SIGNIFICANT CHANGE UP (ref 0.5–1.3)
GLUCOSE SERPL-MCNC: 108 MG/DL — HIGH (ref 70–99)
HCT VFR BLD CALC: 29.4 % — LOW (ref 34.5–45)
HGB BLD-MCNC: 9.2 G/DL — LOW (ref 11.5–15.5)
MCHC RBC-ENTMCNC: 31.3 GM/DL — LOW (ref 32–36)
MCHC RBC-ENTMCNC: 33.5 PG — SIGNIFICANT CHANGE UP (ref 27–34)
MCV RBC AUTO: 106.9 FL — HIGH (ref 80–100)
NRBC # BLD: 0 /100 WBCS — SIGNIFICANT CHANGE UP (ref 0–0)
PLATELET # BLD AUTO: 182 K/UL — SIGNIFICANT CHANGE UP (ref 150–400)
POTASSIUM SERPL-MCNC: 4.6 MMOL/L — SIGNIFICANT CHANGE UP (ref 3.5–5.3)
POTASSIUM SERPL-SCNC: 4.6 MMOL/L — SIGNIFICANT CHANGE UP (ref 3.5–5.3)
RBC # BLD: 2.75 M/UL — LOW (ref 3.8–5.2)
RBC # FLD: 12.1 % — SIGNIFICANT CHANGE UP (ref 10.3–14.5)
SODIUM SERPL-SCNC: 141 MMOL/L — SIGNIFICANT CHANGE UP (ref 135–145)
VANCOMYCIN TROUGH SERPL-MCNC: 15.8 UG/ML — SIGNIFICANT CHANGE UP (ref 10–20)
WBC # BLD: 6.43 K/UL — SIGNIFICANT CHANGE UP (ref 3.8–10.5)
WBC # FLD AUTO: 6.43 K/UL — SIGNIFICANT CHANGE UP (ref 3.8–10.5)

## 2019-05-28 PROCEDURE — 99233 SBSQ HOSP IP/OBS HIGH 50: CPT | Mod: GC

## 2019-05-28 RX ORDER — VANCOMYCIN HCL 1 G
750 VIAL (EA) INTRAVENOUS DAILY
Refills: 0 | Status: COMPLETED | OUTPATIENT
Start: 2019-05-28 | End: 2019-05-29

## 2019-05-28 RX ADMIN — GABAPENTIN 100 MILLIGRAM(S): 400 CAPSULE ORAL at 06:00

## 2019-05-28 RX ADMIN — LIDOCAINE 1 PATCH: 4 CREAM TOPICAL at 11:48

## 2019-05-28 RX ADMIN — Medication 650 MILLIGRAM(S): at 14:00

## 2019-05-28 RX ADMIN — Medication 250 MILLIGRAM(S): at 00:50

## 2019-05-28 RX ADMIN — DONEPEZIL HYDROCHLORIDE 10 MILLIGRAM(S): 10 TABLET, FILM COATED ORAL at 21:36

## 2019-05-28 RX ADMIN — GABAPENTIN 100 MILLIGRAM(S): 400 CAPSULE ORAL at 17:14

## 2019-05-28 RX ADMIN — Medication 650 MILLIGRAM(S): at 12:55

## 2019-05-28 RX ADMIN — LIDOCAINE 1 PATCH: 4 CREAM TOPICAL at 20:09

## 2019-05-28 RX ADMIN — Medication 650 MILLIGRAM(S): at 06:12

## 2019-05-28 RX ADMIN — ENOXAPARIN SODIUM 30 MILLIGRAM(S): 100 INJECTION SUBCUTANEOUS at 11:47

## 2019-05-28 RX ADMIN — Medication 650 MILLIGRAM(S): at 08:10

## 2019-05-28 RX ADMIN — Medication 1 APPLICATION(S): at 11:48

## 2019-05-28 RX ADMIN — LIDOCAINE 1 PATCH: 4 CREAM TOPICAL at 00:44

## 2019-05-28 RX ADMIN — PANTOPRAZOLE SODIUM 40 MILLIGRAM(S): 20 TABLET, DELAYED RELEASE ORAL at 12:41

## 2019-05-28 NOTE — PROGRESS NOTE ADULT - SUBJECTIVE AND OBJECTIVE BOX
89 y/o F with PMH of Alzheimer's disease, HTN, and R breast cancer s/p  mastectomy 25 years ago came in with c/o L leg swelling and pain x 1 month. Patient states that the she had a small wound on her left heel since 1 month which has been worsening. It is associated with L foot erythema, L leg swelling and pain. Denies any traumatic event prior to this episode. Denies fever. She said the wound is black in color and is increasing in size. She went to see her PMD 1 week ago and was prescribed doxycycline without significant improvement. No other complaints at this time.  Pt had attempted LLE doppler but was unable to be completed. Reordered. Pt was medicated and it was completed, negative for DVT. Forefoot still with erythema but less tender.   NP Note discussed with  Primary Attending    Patient is a 89y old  Female who presents with a chief complaint of L leg swelling and pain (27 May 2019 17:00)      INTERVAL HPI/OVERNIGHT EVENTS: no new complaints    MEDICATIONS  (STANDING):  donepezil 10 milliGRAM(s) Oral at bedtime  enoxaparin Injectable 30 milliGRAM(s) SubCutaneous daily  gabapentin 100 milliGRAM(s) Oral two times a day  lidocaine   Patch 1 Patch Transdermal daily  pantoprazole    Tablet 40 milliGRAM(s) Oral daily  povidone iodine 10% Solution 1 Application(s) Topical daily    MEDICATIONS  (PRN):  acetaminophen   Tablet .. 650 milliGRAM(s) Oral every 6 hours PRN Temp greater or equal to 38C (100.4F), Moderate Pain (4 - 6)  traMADol 25 milliGRAM(s) Oral every 6 hours PRN Severe Pain (7 - 10)      __________________________________________________  REVIEW OF SYSTEMS:    CONSTITUTIONAL: No fever,   EYES: no acute visual disturbances  NECK: No pain or stiffness  RESPIRATORY: No cough; No shortness of breath  CARDIOVASCULAR: No chest pain, no palpitations  GASTROINTESTINAL: No pain. No nausea or vomiting; No diarrhea   NEUROLOGICAL: No headache or numbness, no tremors  MUSCULOSKELETAL: LLE pain to touch  GENITOURINARY: no dysuria, no frequency, no hesitancy  PSYCHIATRY: no depression , no anxiety  ALL OTHER  ROS negative        Vital Signs Last 24 Hrs  T(C): 36.5 (28 May 2019 12:33), Max: 37.3 (27 May 2019 15:03)  T(F): 97.7 (28 May 2019 12:33), Max: 99.2 (27 May 2019 15:03)  HR: 69 (28 May 2019 12:33) (69 - 79)  BP: 101/31 (28 May 2019 12:33) (101/31 - 108/36)  BP(mean): 50 (28 May 2019 12:33) (50 - 50)  RR: 14 (28 May 2019 12:33) (14 - 18)  SpO2: 99% (28 May 2019 12:33) (98% - 100%)    ________________________________________________  PHYSICAL EXAM:  GENERAL: NAD  HEENT: Normocephalic;  conjunctivae and sclerae clear; moist mucous membranes;   NECK : supple  CHEST/LUNG: Clear to auscultation bilaterally   HEART: S1 S2  regular;   ABDOMEN: Soft, Nontender, Nondistended; Bowel sounds present  EXTREMITIES: no cyanosis; no edema; no calf tenderness  SKIN: warm and dry; no rash  NERVOUS SYSTEM:  Awake and alert;     _________________________________________________  LABS:                        9.2    6.43  )-----------( 182      ( 28 May 2019 07:14 )             29.4     05-28    141  |  111<H>  |  32<H>  ----------------------------<  108<H>  4.6   |  22  |  1.13    Ca    8.5      28 May 2019 07:14  Mg     2.4     05-26          CAPILLARY BLOOD GLUCOSE            RADIOLOGY & ADDITIONAL TESTS:    Imaging Personally Reviewed:  YES    Consultant(s) Notes Reviewed:   YES    Care Discussed with Consultants :     Plan of care was discussed with  primary care giver; all questions and concerns were addressed and care was aligned with patient's wishes.

## 2019-05-28 NOTE — PROGRESS NOTE ADULT - PROBLEM SELECTOR PLAN 1
Mostly resolved. continue Vancomycin IV  Continue local wound care.   Continue Tramadol , lidocaine patch and gabapentin for pain control  Doppler negative for DVT

## 2019-05-28 NOTE — PROGRESS NOTE ADULT - PROBLEM SELECTOR PLAN 2
On losartan 100mg q day at home   Continue to hold and start when appropriate as  /31 this AM  monitor blood pressure  DASH diet

## 2019-05-28 NOTE — PROGRESS NOTE ADULT - SUBJECTIVE AND OBJECTIVE BOX
MEDICAL ATTENDING NOTE :    Patient is a 89y old  Female who presents with a chief complaint of L leg swelling and pain (28 May 2019 14:16)      INTERVAL HPI / OVERNIGHT EVENTS: patient with uneventful night and offers no new complaints    ----------------------------------------------------------------------------------  REVIEW OF SYSTEMS: no fever; no SOB      Vital Signs Last 24 Hrs  T(C): 36.9 (28 May 2019 20:32), Max: 36.9 (28 May 2019 20:32)  T(F): 98.5 (28 May 2019 20:32), Max: 98.5 (28 May 2019 20:32)  HR: 91 (28 May 2019 20:32) (69 - 99)  BP: 105/37 (28 May 2019 20:32) (101/31 - 112/32)  BP(mean): 50 (28 May 2019 12:33) (50 - 50)  RR: 16 (28 May 2019 20:32) (14 - 16)  SpO2: 99% (28 May 2019 20:32) (99% - 100%)    _________________  PHYSICAL EXAM:  ---------------------------   NAD; Normocephalic  LUNGS - no wheezing  HEART: S1 S2+   ABDOMEN: Soft, Nontender, non distended  EXTREMITIES: no cyanosis; no edema; left heel dry blister      _________________________________________________  LABS:                        9.2    6.43  )-----------( 182      ( 28 May 2019 07:14 )             29.4     05-28    141  |  111<H>  |  32<H>  ----------------------------<  108<H>  4.6   |  22  |  1.13    Ca    8.5      28 May 2019 07:14              Plan of care, test results and findings were  discussed with patient ( and HCP &/or primary care giver) ; all questions and concerns were addressed and care was aligned with patient's wishes.  PMD follow up after discharge from the hospital for continued care and outpatient monitoring was advised.  Discharge plans has been  discussed with care team including the housestaff, nursing staff  and discharge planners- ( /  .)

## 2019-05-29 PROCEDURE — 99232 SBSQ HOSP IP/OBS MODERATE 35: CPT | Mod: GC

## 2019-05-29 RX ORDER — PREGABALIN 225 MG/1
1 CAPSULE ORAL
Qty: 30 | Refills: 0
Start: 2019-05-29 | End: 2019-06-27

## 2019-05-29 RX ADMIN — GABAPENTIN 100 MILLIGRAM(S): 400 CAPSULE ORAL at 17:50

## 2019-05-29 RX ADMIN — Medication 1 APPLICATION(S): at 13:32

## 2019-05-29 RX ADMIN — LIDOCAINE 1 PATCH: 4 CREAM TOPICAL at 00:28

## 2019-05-29 RX ADMIN — GABAPENTIN 100 MILLIGRAM(S): 400 CAPSULE ORAL at 06:17

## 2019-05-29 RX ADMIN — Medication 250 MILLIGRAM(S): at 17:50

## 2019-05-29 RX ADMIN — DONEPEZIL HYDROCHLORIDE 10 MILLIGRAM(S): 10 TABLET, FILM COATED ORAL at 23:00

## 2019-05-29 RX ADMIN — LIDOCAINE 1 PATCH: 4 CREAM TOPICAL at 23:04

## 2019-05-29 RX ADMIN — ENOXAPARIN SODIUM 30 MILLIGRAM(S): 100 INJECTION SUBCUTANEOUS at 12:02

## 2019-05-29 RX ADMIN — PANTOPRAZOLE SODIUM 40 MILLIGRAM(S): 20 TABLET, DELAYED RELEASE ORAL at 12:02

## 2019-05-29 RX ADMIN — Medication 250 MILLIGRAM(S): at 06:57

## 2019-05-29 RX ADMIN — LIDOCAINE 1 PATCH: 4 CREAM TOPICAL at 12:02

## 2019-05-29 NOTE — CHART NOTE - NSCHARTNOTEFT_GEN_A_CORE
EVENT: Unsuccessful attempt overnight to obtain IV access so that pt can get her medication. Of note R breast cancer was 25 years ago. Son reassured that its okay to use right arm to place IV will speak to his daughter, response pending.    OBJECTIVE:  Vital Signs Last 24 Hrs  T(C): 36.7 (29 May 2019 05:03), Max: 36.9 (28 May 2019 20:32)  T(F): 98 (29 May 2019 05:03), Max: 98.5 (28 May 2019 20:32)  HR: 72 (29 May 2019 05:03) (69 - 99)  BP: 121/64 (29 May 2019 05:03) (101/31 - 121/64)  BP(mean): 50 (28 May 2019 12:33) (50 - 50)  RR: 16 (29 May 2019 05:03) (14 - 16)  SpO2: 100% (29 May 2019 05:03) (99% - 100%)    FOCUSED PHYSICAL EXAM:  CHEST/LUNG: Clear to auscultation bilaterally; No wheezes or rales  HEART: Regular rate and rhythm; No murmurs, rubs, or gallops  ABDOMEN: Soft, Nontender, Nondistended, Normal bowel sounds  EXTREMITIES:  2+ Peripheral Pulses, dsg to right heel  Neurological: Awake, oriented to name on and off  Skin: Warm and dry  Musculoskeletal: Atraumatic, LROM    LABS:                        9.2    6.43  )-----------( 182      ( 28 May 2019 07:14 )             29.4     05-28    141  |  111<H>  |  32<H>  ----------------------------<  108<H>  4.6   |  22  |  1.13    Ca    8.5      28 May 2019 07:14    ASSESSMENT:  HPI:  89 y/o F with PMHx of Alzheimer's disease, HTN, and R breast cancer s/p  mastectomy 25 years ago came in with c/o L leg swelling and pain x 1 month. Patient states that the she had a small wound on her left heel since 1 month which has been worsening. It is associated with L foot erythema, L leg swelling and pain. Denies any traumatic event prior to this episode. Denies fever. She said the wound is black in color and is increasing in size. She went to see her PMD 1 week ago and was prescribed doxycycline without significant improvement. No other complaints at this time. SH: denies smoking or alcohol use. Lives by herself, has HHA 24/7. Wheelchair bound at baseline (16 May 2019 05:49)    PLAN:   1. Pt is due vancomycin  IVPB   2. Awaiting family response re IV access

## 2019-05-29 NOTE — PROGRESS NOTE ADULT - SUBJECTIVE AND OBJECTIVE BOX
MEDICAL ATTENDING DISCHARGE NOTE :    Patient is a 89y old  Female who presents with a chief complaint of L leg swelling and pain (28 May 2019 21:54)      INTERVAL HPI / OVERNIGHT EVENTS: patient with uneventful night and offers no new complaints    ----------------------------------------------------------------------------------  REVIEW OF SYSTEMS: no fever; no SOB      Vital Signs Last 24 Hrs  T(C): 36.9 (29 May 2019 20:41), Max: 36.9 (29 May 2019 20:41)  T(F): 98.4 (29 May 2019 20:41), Max: 98.4 (29 May 2019 20:41)  HR: 69 (29 May 2019 20:41) (69 - 72)  BP: 104/34 (29 May 2019 20:41) (104/34 - 121/64)  BP(mean): --  RR: 15 (29 May 2019 20:41) (15 - 16)  SpO2: 100% (29 May 2019 20:41) (100% - 100%)    _________________  PHYSICAL EXAM:  ---------------------------   NAD; Normocephalic  LUNGS - no wheezing  HEART: S1 S2+   ABDOMEN: Soft, Nontender, non distended  EXTREMITIES: no cyanosis; no edema      _________________________________________________  LABS:                        9.2    6.43  )-----------( 182      ( 28 May 2019 07:14 )             29.4     05-28    141  |  111<H>  |  32<H>  ----------------------------<  108<H>  4.6   |  22  |  1.13    Ca    8.5      28 May 2019 07:14              Plan of care, test results and findings were  discussed with patient ( and HCP &/or primary care giver) ; all questions and concerns were addressed and care was aligned with patient's wishes.  PMD follow up after discharge from the hospital for continued care and outpatient monitoring was advised.  Discharge plans has been  discussed with care team including the housestaff, nursing staff  and discharge planners- ( /  .) MEDICAL ATTENDING  NOTE :    Patient is a 89y old  Female who presents with a chief complaint of L leg swelling and pain (28 May 2019 21:54)      INTERVAL HPI / OVERNIGHT EVENTS: patient with uneventful night and offers no new complaints    ----------------------------------------------------------------------------------  REVIEW OF SYSTEMS: no fever; no SOB      Vital Signs Last 24 Hrs  T(C): 36.9 (29 May 2019 20:41), Max: 36.9 (29 May 2019 20:41)  T(F): 98.4 (29 May 2019 20:41), Max: 98.4 (29 May 2019 20:41)  HR: 69 (29 May 2019 20:41) (69 - 72)  BP: 104/34 (29 May 2019 20:41) (104/34 - 121/64)  BP(mean): --  RR: 15 (29 May 2019 20:41) (15 - 16)  SpO2: 100% (29 May 2019 20:41) (100% - 100%)    _________________  PHYSICAL EXAM:  ---------------------------   NAD; Normocephalic  LUNGS - no wheezing  HEART: S1 S2+   ABDOMEN: Soft, Nontender, non distended  EXTREMITIES: no cyanosis; no edema      _________________________________________________  LABS:                        9.2    6.43  )-----------( 182      ( 28 May 2019 07:14 )             29.4     05-28    141  |  111<H>  |  32<H>  ----------------------------<  108<H>  4.6   |  22  |  1.13    Ca    8.5      28 May 2019 07:14              Plan of care, test results and findings were  discussed with patient ( and HCP &/or primary care giver) ; all questions and concerns were addressed and care was aligned with patient's wishes.  PMD follow up after discharge from the hospital for continued care and outpatient monitoring was advised.  Discharge plans has been  discussed with care team including the housestaff, nursing staff  and discharge planners- ( /  .)

## 2019-05-30 ENCOUNTER — TRANSCRIPTION ENCOUNTER (OUTPATIENT)
Age: 84
End: 2019-05-30

## 2019-05-30 VITALS
RESPIRATION RATE: 14 BRPM | SYSTOLIC BLOOD PRESSURE: 113 MMHG | TEMPERATURE: 98 F | DIASTOLIC BLOOD PRESSURE: 34 MMHG | OXYGEN SATURATION: 99 % | HEART RATE: 78 BPM

## 2019-05-30 PROCEDURE — 99285 EMERGENCY DEPT VISIT HI MDM: CPT | Mod: 25

## 2019-05-30 PROCEDURE — 82746 ASSAY OF FOLIC ACID SERUM: CPT

## 2019-05-30 PROCEDURE — 83540 ASSAY OF IRON: CPT

## 2019-05-30 PROCEDURE — 80202 ASSAY OF VANCOMYCIN: CPT

## 2019-05-30 PROCEDURE — 36415 COLL VENOUS BLD VENIPUNCTURE: CPT

## 2019-05-30 PROCEDURE — 93970 EXTREMITY STUDY: CPT

## 2019-05-30 PROCEDURE — 96374 THER/PROPH/DIAG INJ IV PUSH: CPT

## 2019-05-30 PROCEDURE — 84466 ASSAY OF TRANSFERRIN: CPT

## 2019-05-30 PROCEDURE — 93005 ELECTROCARDIOGRAM TRACING: CPT

## 2019-05-30 PROCEDURE — 73610 X-RAY EXAM OF ANKLE: CPT

## 2019-05-30 PROCEDURE — 82607 VITAMIN B-12: CPT

## 2019-05-30 PROCEDURE — 87040 BLOOD CULTURE FOR BACTERIA: CPT

## 2019-05-30 PROCEDURE — 84100 ASSAY OF PHOSPHORUS: CPT

## 2019-05-30 PROCEDURE — 85652 RBC SED RATE AUTOMATED: CPT

## 2019-05-30 PROCEDURE — 84145 PROCALCITONIN (PCT): CPT

## 2019-05-30 PROCEDURE — 73630 X-RAY EXAM OF FOOT: CPT

## 2019-05-30 PROCEDURE — 99239 HOSP IP/OBS DSCHRG MGMT >30: CPT | Mod: GC

## 2019-05-30 PROCEDURE — 82728 ASSAY OF FERRITIN: CPT

## 2019-05-30 PROCEDURE — 83550 IRON BINDING TEST: CPT

## 2019-05-30 PROCEDURE — 83735 ASSAY OF MAGNESIUM: CPT

## 2019-05-30 PROCEDURE — 80053 COMPREHEN METABOLIC PANEL: CPT

## 2019-05-30 PROCEDURE — 97110 THERAPEUTIC EXERCISES: CPT

## 2019-05-30 PROCEDURE — 97530 THERAPEUTIC ACTIVITIES: CPT

## 2019-05-30 PROCEDURE — 80048 BASIC METABOLIC PNL TOTAL CA: CPT

## 2019-05-30 PROCEDURE — 85027 COMPLETE CBC AUTOMATED: CPT

## 2019-05-30 PROCEDURE — 82962 GLUCOSE BLOOD TEST: CPT

## 2019-05-30 PROCEDURE — 93971 EXTREMITY STUDY: CPT

## 2019-05-30 RX ORDER — TRAMADOL HYDROCHLORIDE 50 MG/1
0.5 TABLET ORAL
Qty: 0 | Refills: 0 | DISCHARGE
Start: 2019-05-30

## 2019-05-30 RX ORDER — LIDOCAINE 4 G/100G
1 CREAM TOPICAL
Qty: 0 | Refills: 0 | DISCHARGE
Start: 2019-05-30

## 2019-05-30 RX ORDER — LOSARTAN POTASSIUM 100 MG/1
1 TABLET, FILM COATED ORAL
Qty: 0 | Refills: 0 | DISCHARGE

## 2019-05-30 RX ORDER — POVIDONE-IODINE 5 %
1 AEROSOL (ML) TOPICAL
Qty: 0 | Refills: 0 | DISCHARGE
Start: 2019-05-30

## 2019-05-30 RX ADMIN — ENOXAPARIN SODIUM 30 MILLIGRAM(S): 100 INJECTION SUBCUTANEOUS at 12:56

## 2019-05-30 RX ADMIN — Medication 1 APPLICATION(S): at 12:56

## 2019-05-30 RX ADMIN — LIDOCAINE 1 PATCH: 4 CREAM TOPICAL at 01:48

## 2019-05-30 RX ADMIN — PANTOPRAZOLE SODIUM 40 MILLIGRAM(S): 20 TABLET, DELAYED RELEASE ORAL at 12:59

## 2019-05-30 RX ADMIN — GABAPENTIN 100 MILLIGRAM(S): 400 CAPSULE ORAL at 05:26

## 2019-05-30 RX ADMIN — LIDOCAINE 1 PATCH: 4 CREAM TOPICAL at 12:56

## 2019-05-30 NOTE — PROGRESS NOTE ADULT - PROBLEM SELECTOR PROBLEM 5
Blister of left heel, initial encounter
Need for prophylactic measure
Blister of left heel, initial encounter

## 2019-05-30 NOTE — DISCHARGE NOTE NURSING/CASE MANAGEMENT/SOCIAL WORK - NSDCDPATPORTLINK_GEN_ALL_CORE
You can access the [facility], offered by [facility], by registering with the following website: [email or web address]

## 2019-05-30 NOTE — PROGRESS NOTE ADULT - PROBLEM SELECTOR PROBLEM 2
Deep tissue injury
Hypertension

## 2019-05-30 NOTE — PROGRESS NOTE ADULT - PROBLEM SELECTOR PROBLEM 1
Cellulitis of left lower extremity

## 2019-05-30 NOTE — PROGRESS NOTE ADULT - ASSESSMENT
89 y/o F with PMHx sig for Alzheimer's disease, HTN, and R breast cancer s/p  mastectomy 25 years ago came who presented to the ED with c/o L leg swelling and pain x 1 month. Endorses being seen and evaluated by her PCP and given Doxycycline X 7 days without improvement. Admitted with LLE cellulitis
89 y/o F with PMHx sig for Alzheimer's disease, HTN, and R breast cancer s/p  mastectomy 25 years ago came who presented to the ED with c/o L leg swelling and pain x 1 month. Endorses being seen and evaluated by her PCP and given Doxycycline X 7 days without improvement. Admitted with LLE cellulitis on Ancef
89 y/o F with PMHx sig for Alzheimer's disease, HTN, and R breast cancer s/p  mastectomy 25 years ago came who presented to the ED with c/o L leg swelling and pain x 1 month. Endorses being seen and evaluated by her PCP and given Doxycycline X 7 days without improvement. Admitted with LLE cellulitis on Ancef
89 y/o F with PMHx sig for Alzheimer's disease, HTN, and R breast cancer s/p  mastectomy 25 years ago came who presented to the ED with c/o L leg swelling and pain x 1 month.      Admitted with LLE cellulitis
89 y/o F with PMHx sig for Alzheimer's disease, HTN, and R breast cancer s/p  mastectomy 25 years ago came who presented to the ED with c/o L leg swelling and pain x 1 month. Endorses being seen and evaluated by her PCP and given Doxycycline X 7 days without improvement. Admitted with LLE cellulitis on Ancef
89 y/o F with PMHx sig for Alzheimer's disease, HTN, and R breast cancer s/p  mastectomy 25 years ago came who presented to the ED with c/o L leg swelling and pain x 1 month. Endorses being seen and evaluated by her PCP and given Doxycycline X 7 days without improvement. Admitted with LLE cellulitis.     Problem/Plan - 1:  ·  Problem: Cellulitis of left lower extremity.  Plan: CW vancomycin renally dosed  clinically improving.   Pain control - added Tramadol 25mg [id number] as needed, lidocaine patch, gabapentin  Doppler negative for DVT.      Problem/Plan - 2:  ·  Problem: Hypertension.  Plan: Controlled off meds.      Problem/Plan - 3:  ·  Problem: Alzheimer's disease.  Plan: continue Aricept.      Problem/Plan - 4:  ·  Problem: Anemia due to vitamin B12 deficiency, unspecified B12 deficiency type.  Plan: continue B 12 injections.      Problem/Plan - 5:  ·  Problem: Need for prophylactic measure.  Plan: DVT ppx with Lovenox  GI prophylaxis with PPI.     -Dispo: dc to NH as per family
89 y/o F with PMHx sig for Alzheimer's disease, HTN, and R breast cancer s/p  mastectomy 25 years ago came who presented to the ED with c/o L leg swelling and pain x 1 month. Endorses being seen and evaluated by her PCP and given Doxycycline X 7 days without improvement. Admitted with LLE cellulitis on Ancef

## 2019-05-30 NOTE — PROGRESS NOTE ADULT - PROVIDER SPECIALTY LIST ADULT
Hospitalist
Internal Medicine

## 2019-05-30 NOTE — PROGRESS NOTE ADULT - PROBLEM SELECTOR PROBLEM 4
Alzheimer's disease
Anemia due to vitamin B12 deficiency, unspecified B12 deficiency type

## 2019-05-30 NOTE — PROGRESS NOTE ADULT - SUBJECTIVE AND OBJECTIVE BOX
MEDICAL ATTENDING DISCHARGE NOTE :    Patient is a 89y old  Female who presents with a chief complaint of L leg swelling and pain (29 May 2019 23:23)      INTERVAL HPI / OVERNIGHT EVENTS: patient with uneventful night and offers no new complaints    ----------------------------------------------------------------------------------  REVIEW OF SYSTEMS: no fever; no SOB      Vital Signs Last 24 Hrs  T(C): 36.8 (30 May 2019 12:58), Max: 36.9 (30 May 2019 05:08)  T(F): 98.2 (30 May 2019 12:58), Max: 98.4 (30 May 2019 05:08)  HR: 78 (30 May 2019 12:58) (73 - 78)  BP: 113/34 (30 May 2019 12:58) (113/34 - 121/39)  BP(mean): 54 (30 May 2019 12:58) (54 - 54)  RR: 14 (30 May 2019 12:58) (14 - 16)  SpO2: 99% (30 May 2019 12:58) (97% - 99%)    _________________  PHYSICAL EXAM:  ---------------------------   NAD; Normocephalic  LUNGS - no wheezing  HEART: S1 S2+   ABDOMEN: Soft, Nontender, non distended  EXTREMITIES: no cyanosis; no edema; left heel dry blister      _________________________________________________  LABS:                  Plan of care, test results and findings were  discussed with patient ( and HCP &/or primary care giver) ; all questions and concerns were addressed and care was aligned with patient's wishes.  PMD follow up after discharge from the hospital for continued care and outpatient monitoring was advised.  Discharge plans has been  discussed with care team including the housestaff, nursing staff  and discharge planners- ( /  .)

## 2019-05-30 NOTE — PROGRESS NOTE ADULT - REASON FOR ADMISSION
L leg swelling and pain

## 2019-05-30 NOTE — PROGRESS NOTE ADULT - PROBLEM SELECTOR PROBLEM 3
Hypertension
Hypertension
Alzheimer's disease
Hypertension
Hypertension
Alzheimer's disease
Alzheimer's disease

## 2019-05-30 NOTE — PROGRESS NOTE ADULT - PROBLEM SELECTOR PLAN 5
continue Local wound care.
DVT ppx with Lovenox  GI prophylaxis with PPI
Local wound care.

## 2023-06-04 NOTE — ED ADULT TRIAGE NOTE - HEIGHT IN INCHES
Return to the ER for further concerns or worsening symptoms  Follow up with your primary care physician and gastroenterology in 1-2 days  Take medication as prescribed 2